# Patient Record
Sex: FEMALE | Race: WHITE | NOT HISPANIC OR LATINO | Employment: OTHER | URBAN - METROPOLITAN AREA
[De-identification: names, ages, dates, MRNs, and addresses within clinical notes are randomized per-mention and may not be internally consistent; named-entity substitution may affect disease eponyms.]

---

## 2017-02-09 ENCOUNTER — TRANSCRIBE ORDERS (OUTPATIENT)
Dept: ADMINISTRATIVE | Facility: HOSPITAL | Age: 72
End: 2017-02-09

## 2017-02-09 DIAGNOSIS — R41.0 CONFUSION: ICD-10-CM

## 2017-02-09 DIAGNOSIS — R55 SYNCOPE, UNSPECIFIED SYNCOPE TYPE: Primary | ICD-10-CM

## 2017-02-17 ENCOUNTER — HOSPITAL ENCOUNTER (OUTPATIENT)
Dept: CT IMAGING | Facility: HOSPITAL | Age: 72
Discharge: HOME | End: 2017-02-17
Payer: COMMERCIAL

## 2017-02-17 DIAGNOSIS — R55 SYNCOPE, UNSPECIFIED SYNCOPE TYPE: ICD-10-CM

## 2017-02-17 DIAGNOSIS — R41.0 CONFUSION: ICD-10-CM

## 2017-02-17 PROCEDURE — 70450 CT HEAD/BRAIN W/O DYE: CPT

## 2017-08-30 ENCOUNTER — CONVERSION ENCOUNTER (OUTPATIENT)
Dept: MAMMOGRAPHY | Facility: CLINIC | Age: 72
End: 2017-08-30

## 2017-08-30 ENCOUNTER — GENERIC CONVERSION - ENCOUNTER (OUTPATIENT)
Dept: OTHER | Facility: OTHER | Age: 72
End: 2017-08-30

## 2018-01-12 NOTE — MISCELLANEOUS
Message   Recorded as Task   Date: 03/10/2016 09:38 AM, Created By: Elise Hernandez   Task Name: Follow Up   Assigned To: [facility],Team   Regarding Patient: Maritza John, Status: Active   Comment:    Elise Hernandez - 10 Mar 2016 9:38 AM     TASK CREATED  Caller: Shirley Price  Routine post op call placed to patient  She had her surgery on 3/9/16  She had a right breast biopsy, lumpectomy breast needle localized excisional biopsy  She had no problems to report  She did ask if she may drive  I informed her that if she is not taking prescription pain medication, she may drive as long as she feels able  Her post op appointment is scheduled on 3/21/16 @ 1300 with ANA MARÍA Barrow  Pathology is pending  Lin Murphy - 10 Mar 2016 11:16 AM     TASK REASSIGNED: Previously Assigned To Morena Laurent - 14 Mar 2016 3:22 PM     TASK EDITED  Pathology pending  Lin Murphy - 22 Mar 2016 8:34 AM     TASK EDITED  Called patient and informed her that the pathology on the breast biopsy done 3/9/16 has now been resulted and was benign  No signs of cancer or abnormal concerning cells  Active Problems    1  Abnormal mammogram (793 80) (R92 8)   2  Breast cancer genetic susceptibility (V84 01) (Z15 01)    Current Meds   1  Ambien 10 MG Oral Tablet (Zolpidem Tartrate); Therapy: (Recorded:85Raw3702) to Recorded   2  Calcium TABS; Therapy: (Recorded:86Bmk4251) to Recorded   3  Estrace CREA; Therapy: (Recorded:74Lit4384) to Recorded   4  Vitamin B-12 TABS; Therapy: (Recorded:17Fwu7112) to Recorded   5  Vitamin C TABS; Therapy: (Recorded:02Wga4057) to Recorded   6  Vitamin D TABS; Therapy: (Recorded:65Jkd4463) to Recorded   7  Vitamin E TABS; Therapy: (Recorded:10Apx4837) to Recorded    Allergies    1  Cipro TABS   2  Morphine Derivatives   3   Penicillins    Signatures   Electronically signed by : Shirley Price, ; Mar 22 2016  8:35AM EST (Author)

## 2018-08-24 ENCOUNTER — TRANSCRIBE ORDERS (OUTPATIENT)
Dept: ADMINISTRATIVE | Facility: HOSPITAL | Age: 73
End: 2018-08-24

## 2018-08-24 DIAGNOSIS — Z12.39 SCREENING BREAST EXAMINATION: Primary | ICD-10-CM

## 2018-08-31 ENCOUNTER — HOSPITAL ENCOUNTER (OUTPATIENT)
Dept: MAMMOGRAPHY | Facility: CLINIC | Age: 73
Discharge: HOME | End: 2018-08-31
Payer: COMMERCIAL

## 2018-08-31 DIAGNOSIS — Z12.39 SCREENING BREAST EXAMINATION: ICD-10-CM

## 2018-08-31 PROCEDURE — 77067 SCR MAMMO BI INCL CAD: CPT

## 2018-09-07 ENCOUNTER — HOSPITAL ENCOUNTER (OUTPATIENT)
Dept: RADIOLOGY | Facility: HOSPITAL | Age: 73
Discharge: HOME | End: 2018-09-07
Payer: COMMERCIAL

## 2018-09-07 ENCOUNTER — TRANSCRIBE ORDERS (OUTPATIENT)
Dept: ADMINISTRATIVE | Facility: HOSPITAL | Age: 73
End: 2018-09-07

## 2018-09-07 DIAGNOSIS — R05.9 COUGH: ICD-10-CM

## 2018-09-07 DIAGNOSIS — R06.00 DYSPNEA, UNSPECIFIED TYPE: ICD-10-CM

## 2018-09-07 DIAGNOSIS — R06.00 DYSPNEA, UNSPECIFIED TYPE: Primary | ICD-10-CM

## 2018-09-07 PROCEDURE — 71046 X-RAY EXAM CHEST 2 VIEWS: CPT

## 2019-01-09 ENCOUNTER — TRANSCRIBE ORDERS (OUTPATIENT)
Dept: ADMINISTRATIVE | Facility: HOSPITAL | Age: 74
End: 2019-01-09

## 2019-01-09 ENCOUNTER — APPOINTMENT (OUTPATIENT)
Dept: LAB | Facility: HOSPITAL | Age: 74
End: 2019-01-09
Payer: COMMERCIAL

## 2019-01-09 DIAGNOSIS — E88.81 METABOLIC SYNDROME: ICD-10-CM

## 2019-01-09 DIAGNOSIS — R73.01 IMPAIRED FASTING GLUCOSE: ICD-10-CM

## 2019-01-09 DIAGNOSIS — R68.82 DECREASED LIBIDO: ICD-10-CM

## 2019-01-09 DIAGNOSIS — R73.01 IMPAIRED FASTING GLUCOSE: Primary | ICD-10-CM

## 2019-01-09 DIAGNOSIS — M54.5 LOW BACK PAIN, UNSPECIFIED BACK PAIN LATERALITY, UNSPECIFIED CHRONICITY, WITH SCIATICA PRESENCE UNSPECIFIED: ICD-10-CM

## 2019-01-09 DIAGNOSIS — R63.4 WEIGHT LOSS: ICD-10-CM

## 2019-01-09 DIAGNOSIS — E78.5 HYPERLIPIDEMIA, UNSPECIFIED HYPERLIPIDEMIA TYPE: ICD-10-CM

## 2019-01-09 DIAGNOSIS — R79.9 ABNORMAL BLOOD CHEMISTRY: ICD-10-CM

## 2019-01-09 DIAGNOSIS — E28.39 FEMALE HYPOGONADISM: ICD-10-CM

## 2019-01-09 DIAGNOSIS — E03.9 HYPOTHYROIDISM, UNSPECIFIED TYPE: ICD-10-CM

## 2019-01-09 DIAGNOSIS — M25.50 ARTHRALGIA, UNSPECIFIED JOINT: ICD-10-CM

## 2019-01-09 DIAGNOSIS — R42 DIZZINESS: ICD-10-CM

## 2019-01-09 DIAGNOSIS — I70.91 GENERALIZED ATHEROSCLEROSIS: ICD-10-CM

## 2019-01-09 DIAGNOSIS — R45.86 MOOD SWING: ICD-10-CM

## 2019-01-09 DIAGNOSIS — D50.9 IRON DEFICIENCY ANEMIA, UNSPECIFIED IRON DEFICIENCY ANEMIA TYPE: ICD-10-CM

## 2019-01-09 DIAGNOSIS — D64.9 ANEMIA, UNSPECIFIED TYPE: ICD-10-CM

## 2019-01-09 DIAGNOSIS — R51.9 NONINTRACTABLE HEADACHE, UNSPECIFIED CHRONICITY PATTERN, UNSPECIFIED HEADACHE TYPE: ICD-10-CM

## 2019-01-09 DIAGNOSIS — E55.9 VITAMIN D DEFICIENCY, UNSPECIFIED: ICD-10-CM

## 2019-01-09 DIAGNOSIS — R53.83 FATIGUE, UNSPECIFIED TYPE: ICD-10-CM

## 2019-01-09 LAB
25(OH)D3 SERPL-MCNC: 59.4 NG/ML (ref 30–100)
ALBUMIN SERPL BCP-MCNC: 4.2 G/DL (ref 3–5.2)
ALP SERPL-CCNC: 47 U/L (ref 43–122)
ALT SERPL W P-5'-P-CCNC: 24 U/L (ref 9–52)
ANION GAP SERPL CALCULATED.3IONS-SCNC: 7 MMOL/L (ref 5–14)
AST SERPL W P-5'-P-CCNC: 24 U/L (ref 14–36)
BILIRUB SERPL-MCNC: 0.3 MG/DL
BILIRUB UR QL STRIP: NEGATIVE
BUN SERPL-MCNC: 14 MG/DL (ref 5–25)
CALCIUM SERPL-MCNC: 9.2 MG/DL (ref 8.4–10.2)
CHLORIDE SERPL-SCNC: 106 MMOL/L (ref 97–108)
CHOLEST SERPL-MCNC: 193 MG/DL
CLARITY UR: CLEAR
CO2 SERPL-SCNC: 28 MMOL/L (ref 22–30)
COLOR UR: NORMAL
CREAT SERPL-MCNC: 0.73 MG/DL (ref 0.6–1.2)
ERYTHROCYTE [DISTWIDTH] IN BLOOD BY AUTOMATED COUNT: 13.9 %
FERRITIN SERPL-MCNC: 15 NG/ML (ref 8–388)
FOLATE SERPL-MCNC: >20 NG/ML (ref 3.1–17.5)
GFR SERPL CREATININE-BSD FRML MDRD: 83 ML/MIN/1.73SQ M
GLUCOSE P FAST SERPL-MCNC: 112 MG/DL (ref 70–99)
GLUCOSE UR STRIP-MCNC: NEGATIVE MG/DL
HCT VFR BLD AUTO: 43.7 % (ref 36–46)
HDLC SERPL-MCNC: 62 MG/DL (ref 40–59)
HGB BLD-MCNC: 14.2 G/DL (ref 12–16)
HGB UR QL STRIP.AUTO: NEGATIVE
IRON SATN MFR SERPL: 14 %
IRON SERPL-MCNC: 50 UG/DL (ref 50–170)
KETONES UR STRIP-MCNC: NEGATIVE MG/DL
LDLC SERPL CALC-MCNC: 119 MG/DL
LEUKOCYTE ESTERASE UR QL STRIP: NEGATIVE
MAGNESIUM SERPL-MCNC: 2.3 MG/DL (ref 1.6–2.3)
MCH RBC QN AUTO: 30.3 PG (ref 26–34)
MCHC RBC AUTO-ENTMCNC: 32.6 G/DL (ref 31–36)
MCV RBC AUTO: 93 FL (ref 80–100)
NITRITE UR QL STRIP: NEGATIVE
NONHDLC SERPL-MCNC: 131 MG/DL
PH UR STRIP.AUTO: 8 [PH] (ref 4.5–8)
PHOSPHATE SERPL-MCNC: 3.5 MG/DL (ref 2.5–4.8)
PLATELET # BLD AUTO: 284 THOUSANDS/UL (ref 150–450)
PMV BLD AUTO: 8.6 FL (ref 8.9–12.7)
POTASSIUM SERPL-SCNC: 4.8 MMOL/L (ref 3.6–5)
PROT SERPL-MCNC: 6.7 G/DL (ref 5.9–8.4)
PROT UR STRIP-MCNC: NEGATIVE MG/DL
RBC # BLD AUTO: 4.7 MILLION/UL (ref 4–5.2)
SODIUM SERPL-SCNC: 141 MMOL/L (ref 137–147)
SP GR UR STRIP.AUTO: 1.01 (ref 1–1.04)
TIBC SERPL-MCNC: 352 UG/DL (ref 250–450)
TRIGL SERPL-MCNC: 60 MG/DL
TSH SERPL DL<=0.05 MIU/L-ACNC: 0.98 UIU/ML (ref 0.47–4.68)
URATE SERPL-MCNC: 3.5 MG/DL (ref 2.7–7.5)
UROBILINOGEN UA: NEGATIVE MG/DL
VIT B12 SERPL-MCNC: 770 PG/ML (ref 100–900)
WBC # BLD AUTO: 8.2 THOUSAND/UL (ref 4.5–11)

## 2019-01-09 PROCEDURE — 82728 ASSAY OF FERRITIN: CPT

## 2019-01-09 PROCEDURE — 84100 ASSAY OF PHOSPHORUS: CPT

## 2019-01-09 PROCEDURE — 80053 COMPREHEN METABOLIC PANEL: CPT

## 2019-01-09 PROCEDURE — 83735 ASSAY OF MAGNESIUM: CPT

## 2019-01-09 PROCEDURE — 83550 IRON BINDING TEST: CPT

## 2019-01-09 PROCEDURE — 84550 ASSAY OF BLOOD/URIC ACID: CPT

## 2019-01-09 PROCEDURE — 84443 ASSAY THYROID STIM HORMONE: CPT

## 2019-01-09 PROCEDURE — 82306 VITAMIN D 25 HYDROXY: CPT

## 2019-01-09 PROCEDURE — 80061 LIPID PANEL: CPT

## 2019-01-09 PROCEDURE — 83540 ASSAY OF IRON: CPT

## 2019-01-09 PROCEDURE — 82746 ASSAY OF FOLIC ACID SERUM: CPT

## 2019-01-09 PROCEDURE — 82607 VITAMIN B-12: CPT

## 2019-01-09 PROCEDURE — 85027 COMPLETE CBC AUTOMATED: CPT

## 2019-01-09 PROCEDURE — 36415 COLL VENOUS BLD VENIPUNCTURE: CPT

## 2019-08-06 ENCOUNTER — TRANSCRIBE ORDERS (OUTPATIENT)
Dept: ADMINISTRATIVE | Facility: HOSPITAL | Age: 74
End: 2019-08-06

## 2019-08-06 DIAGNOSIS — M81.0 AGE RELATED OSTEOPOROSIS, UNSPECIFIED PATHOLOGICAL FRACTURE PRESENCE: ICD-10-CM

## 2019-08-06 DIAGNOSIS — Z12.39 BREAST SCREENING, UNSPECIFIED: Primary | ICD-10-CM

## 2019-08-22 ENCOUNTER — TRANSCRIBE ORDERS (OUTPATIENT)
Dept: ADMINISTRATIVE | Facility: HOSPITAL | Age: 74
End: 2019-08-22

## 2019-08-22 DIAGNOSIS — R19.7 DIARRHEA OF PRESUMED INFECTIOUS ORIGIN: Primary | ICD-10-CM

## 2019-08-30 ENCOUNTER — TRANSCRIBE ORDERS (OUTPATIENT)
Dept: ADMINISTRATIVE | Facility: HOSPITAL | Age: 74
End: 2019-08-30

## 2019-08-30 ENCOUNTER — HOSPITAL ENCOUNTER (OUTPATIENT)
Dept: RADIOLOGY | Facility: HOSPITAL | Age: 74
Discharge: HOME | End: 2019-08-30
Payer: COMMERCIAL

## 2019-08-30 DIAGNOSIS — M79.642 HAND PAIN, LEFT: ICD-10-CM

## 2019-08-30 DIAGNOSIS — M19.90 ARTHRITIS: Primary | ICD-10-CM

## 2019-08-30 DIAGNOSIS — M19.90 ARTHRITIS: ICD-10-CM

## 2019-08-30 DIAGNOSIS — M25.532 LEFT WRIST PAIN: ICD-10-CM

## 2019-08-30 PROCEDURE — 73100 X-RAY EXAM OF WRIST: CPT

## 2019-08-30 PROCEDURE — 73120 X-RAY EXAM OF HAND: CPT

## 2019-09-04 ENCOUNTER — HOSPITAL ENCOUNTER (OUTPATIENT)
Dept: MAMMOGRAPHY | Facility: CLINIC | Age: 74
End: 2019-09-04
Payer: COMMERCIAL

## 2019-09-04 ENCOUNTER — HOSPITAL ENCOUNTER (OUTPATIENT)
Dept: BONE DENSITY | Facility: CLINIC | Age: 74
Discharge: HOME | End: 2019-09-04
Payer: COMMERCIAL

## 2019-09-04 DIAGNOSIS — M81.0 AGE RELATED OSTEOPOROSIS, UNSPECIFIED PATHOLOGICAL FRACTURE PRESENCE: ICD-10-CM

## 2019-09-04 PROCEDURE — 77080 DXA BONE DENSITY AXIAL: CPT

## 2019-09-06 ENCOUNTER — HOSPITAL ENCOUNTER (OUTPATIENT)
Dept: RADIOLOGY | Facility: HOSPITAL | Age: 74
Discharge: HOME | End: 2019-09-06
Attending: SURGERY
Payer: COMMERCIAL

## 2019-09-06 DIAGNOSIS — R19.7 DIARRHEA OF PRESUMED INFECTIOUS ORIGIN: ICD-10-CM

## 2019-09-06 PROCEDURE — 74280 X-RAY XM COLON 2CNTRST STD: CPT

## 2019-10-04 ENCOUNTER — TRANSCRIBE ORDERS (OUTPATIENT)
Dept: ADMINISTRATIVE | Facility: HOSPITAL | Age: 74
End: 2019-10-04

## 2019-10-04 ENCOUNTER — APPOINTMENT (OUTPATIENT)
Dept: LAB | Facility: HOSPITAL | Age: 74
End: 2019-10-04
Payer: COMMERCIAL

## 2019-10-04 DIAGNOSIS — M25.50 ARTHRALGIA, UNSPECIFIED JOINT: ICD-10-CM

## 2019-10-04 DIAGNOSIS — R35.1 NOCTURIA: ICD-10-CM

## 2019-10-04 DIAGNOSIS — R21 RASH: ICD-10-CM

## 2019-10-04 DIAGNOSIS — E88.81 METABOLIC SYNDROME: ICD-10-CM

## 2019-10-04 DIAGNOSIS — R73.01 IMPAIRED FASTING GLUCOSE: Primary | ICD-10-CM

## 2019-10-04 DIAGNOSIS — M79.609 PAIN IN EXTREMITY, UNSPECIFIED EXTREMITY: ICD-10-CM

## 2019-10-04 DIAGNOSIS — R68.82 DECREASED LIBIDO: ICD-10-CM

## 2019-10-04 DIAGNOSIS — E03.9 HYPOTHYROIDISM, UNSPECIFIED TYPE: ICD-10-CM

## 2019-10-04 DIAGNOSIS — R82.90 ABNORMAL URINE: ICD-10-CM

## 2019-10-04 DIAGNOSIS — R73.01 IMPAIRED FASTING GLUCOSE: ICD-10-CM

## 2019-10-04 DIAGNOSIS — R63.5 WEIGHT GAIN: ICD-10-CM

## 2019-10-04 DIAGNOSIS — E28.39 RESISTANT OVARY SYNDROME: ICD-10-CM

## 2019-10-04 DIAGNOSIS — E55.9 VITAMIN D DEFICIENCY: ICD-10-CM

## 2019-10-04 DIAGNOSIS — R53.83 FATIGUE, UNSPECIFIED TYPE: ICD-10-CM

## 2019-10-04 LAB
25(OH)D3 SERPL-MCNC: 61.2 NG/ML (ref 30–100)
ANION GAP SERPL CALCULATED.3IONS-SCNC: 5 MMOL/L (ref 5–14)
APTT PPP: 25 SECONDS (ref 25–32)
BACTERIA UR QL AUTO: ABNORMAL /HPF
BILIRUB UR QL STRIP: NEGATIVE
BUN SERPL-MCNC: 16 MG/DL (ref 5–25)
CALCIUM SERPL-MCNC: 8.8 MG/DL (ref 8.4–10.2)
CHLORIDE SERPL-SCNC: 105 MMOL/L (ref 97–108)
CHOLEST SERPL-MCNC: 189 MG/DL
CLARITY UR: CLEAR
CO2 SERPL-SCNC: 30 MMOL/L (ref 22–30)
COLOR UR: YELLOW
CREAT SERPL-MCNC: 0.72 MG/DL (ref 0.6–1.2)
FOLATE SERPL-MCNC: >20 NG/ML (ref 3.1–17.5)
GFR SERPL CREATININE-BSD FRML MDRD: 83 ML/MIN/1.73SQ M
GLUCOSE P FAST SERPL-MCNC: 90 MG/DL (ref 70–99)
GLUCOSE UR STRIP-MCNC: NEGATIVE MG/DL
HDLC SERPL-MCNC: 50 MG/DL (ref 40–59)
HGB UR QL STRIP.AUTO: NEGATIVE
INR PPP: 0.87 (ref 0.91–1.09)
IRON SATN MFR SERPL: 18 %
IRON SERPL-MCNC: 52 UG/DL (ref 50–170)
KETONES UR STRIP-MCNC: NEGATIVE MG/DL
LDLC SERPL CALC-MCNC: 119 MG/DL
LEUKOCYTE ESTERASE UR QL STRIP: NEGATIVE
MAGNESIUM SERPL-MCNC: 2.1 MG/DL (ref 1.6–2.3)
MUCOUS THREADS UR QL AUTO: ABNORMAL
NITRITE UR QL STRIP: NEGATIVE
NON-SQ EPI CELLS URNS QL MICRO: ABNORMAL /HPF
NONHDLC SERPL-MCNC: 139 MG/DL
PH UR STRIP.AUTO: 6 [PH]
PHOSPHATE SERPL-MCNC: 3.6 MG/DL (ref 2.5–4.8)
POTASSIUM SERPL-SCNC: 4.1 MMOL/L (ref 3.6–5)
PROT UR STRIP-MCNC: NEGATIVE MG/DL
PROTHROMBIN TIME: 9.7 SECONDS (ref 9.8–12)
RBC #/AREA URNS AUTO: ABNORMAL /HPF
RETICS # CALC: 1.11 % (ref 0.87–2.63)
SODIUM SERPL-SCNC: 140 MMOL/L (ref 137–147)
SP GR UR STRIP.AUTO: 1.02 (ref 1–1.04)
TIBC SERPL-MCNC: 290 UG/DL (ref 250–450)
TRIGL SERPL-MCNC: 98 MG/DL
TSH SERPL DL<=0.05 MIU/L-ACNC: 2.6 UIU/ML (ref 0.47–4.68)
UROBILINOGEN UA: NEGATIVE MG/DL
VIT B12 SERPL-MCNC: 1244 PG/ML (ref 100–900)
WBC #/AREA URNS AUTO: ABNORMAL /HPF

## 2019-10-04 PROCEDURE — 84100 ASSAY OF PHOSPHORUS: CPT

## 2019-10-04 PROCEDURE — 85045 AUTOMATED RETICULOCYTE COUNT: CPT

## 2019-10-04 PROCEDURE — 81001 URINALYSIS AUTO W/SCOPE: CPT | Performed by: FAMILY MEDICINE

## 2019-10-04 PROCEDURE — 85610 PROTHROMBIN TIME: CPT

## 2019-10-04 PROCEDURE — 84443 ASSAY THYROID STIM HORMONE: CPT

## 2019-10-04 PROCEDURE — 80048 BASIC METABOLIC PNL TOTAL CA: CPT

## 2019-10-04 PROCEDURE — 83735 ASSAY OF MAGNESIUM: CPT

## 2019-10-04 PROCEDURE — 82626 DEHYDROEPIANDROSTERONE: CPT

## 2019-10-04 PROCEDURE — 83540 ASSAY OF IRON: CPT

## 2019-10-04 PROCEDURE — 80061 LIPID PANEL: CPT

## 2019-10-04 PROCEDURE — 36415 COLL VENOUS BLD VENIPUNCTURE: CPT

## 2019-10-04 PROCEDURE — 85730 THROMBOPLASTIN TIME PARTIAL: CPT

## 2019-10-04 PROCEDURE — 83550 IRON BINDING TEST: CPT

## 2019-10-04 PROCEDURE — 82668 ASSAY OF ERYTHROPOIETIN: CPT

## 2019-10-04 PROCEDURE — 82746 ASSAY OF FOLIC ACID SERUM: CPT

## 2019-10-04 PROCEDURE — 82607 VITAMIN B-12: CPT

## 2019-10-04 PROCEDURE — 82306 VITAMIN D 25 HYDROXY: CPT

## 2019-10-05 LAB — EPO SERPL-ACNC: 10.2 MIU/ML (ref 2.6–18.5)

## 2019-10-08 LAB — DHEA SERPL-MCNC: 110 NG/DL (ref 31–701)

## 2019-10-09 ENCOUNTER — HOSPITAL ENCOUNTER (OUTPATIENT)
Dept: MAMMOGRAPHY | Facility: CLINIC | Age: 74
Discharge: HOME | End: 2019-10-09
Payer: COMMERCIAL

## 2019-10-09 VITALS — HEIGHT: 60 IN | BODY MASS INDEX: 29.06 KG/M2 | WEIGHT: 148 LBS

## 2019-10-09 DIAGNOSIS — Z12.39 BREAST SCREENING: ICD-10-CM

## 2019-10-09 PROCEDURE — 77063 BREAST TOMOSYNTHESIS BI: CPT

## 2019-10-09 PROCEDURE — 77067 SCR MAMMO BI INCL CAD: CPT

## 2019-10-17 ENCOUNTER — HOSPITAL ENCOUNTER (OUTPATIENT)
Dept: ULTRASOUND IMAGING | Facility: CLINIC | Age: 74
Discharge: HOME | End: 2019-10-17
Payer: COMMERCIAL

## 2019-10-17 ENCOUNTER — HOSPITAL ENCOUNTER (OUTPATIENT)
Dept: MAMMOGRAPHY | Facility: CLINIC | Age: 74
Discharge: HOME | End: 2019-10-17
Payer: COMMERCIAL

## 2019-10-17 VITALS — WEIGHT: 148 LBS | BODY MASS INDEX: 29.06 KG/M2 | HEIGHT: 60 IN

## 2019-10-17 DIAGNOSIS — R92.8 ABNORMAL SCREENING MAMMOGRAM: ICD-10-CM

## 2019-10-17 PROCEDURE — 77065 DX MAMMO INCL CAD UNI: CPT

## 2019-10-17 PROCEDURE — G0279 TOMOSYNTHESIS, MAMMO: HCPCS

## 2020-02-25 ENCOUNTER — APPOINTMENT (OUTPATIENT)
Dept: LAB | Facility: HOSPITAL | Age: 75
End: 2020-02-25
Payer: COMMERCIAL

## 2020-02-25 ENCOUNTER — TRANSCRIBE ORDERS (OUTPATIENT)
Dept: ADMINISTRATIVE | Facility: HOSPITAL | Age: 75
End: 2020-02-25

## 2020-02-25 DIAGNOSIS — E88.81 METABOLIC SYNDROME: Primary | ICD-10-CM

## 2020-02-25 DIAGNOSIS — E88.81 METABOLIC SYNDROME: ICD-10-CM

## 2020-02-25 LAB
ANION GAP SERPL CALCULATED.3IONS-SCNC: 6 MMOL/L (ref 5–14)
BUN SERPL-MCNC: 15 MG/DL (ref 5–25)
CALCIUM SERPL-MCNC: 8.8 MG/DL (ref 8.4–10.2)
CHLORIDE SERPL-SCNC: 105 MMOL/L (ref 97–108)
CO2 SERPL-SCNC: 28 MMOL/L (ref 22–30)
CREAT SERPL-MCNC: 0.7 MG/DL (ref 0.6–1.2)
GFR SERPL CREATININE-BSD FRML MDRD: 86 ML/MIN/1.73SQ M
GLUCOSE SERPL-MCNC: 84 MG/DL (ref 70–99)
INR PPP: 0.91 (ref 0.91–1.09)
POTASSIUM SERPL-SCNC: 4.1 MMOL/L (ref 3.6–5)
PROTHROMBIN TIME: 10 SECONDS (ref 9.8–12)
SODIUM SERPL-SCNC: 139 MMOL/L (ref 137–147)
TSH SERPL DL<=0.05 MIU/L-ACNC: 1.64 UIU/ML (ref 0.47–4.68)

## 2020-02-25 PROCEDURE — 84443 ASSAY THYROID STIM HORMONE: CPT

## 2020-02-25 PROCEDURE — 85610 PROTHROMBIN TIME: CPT

## 2020-02-25 PROCEDURE — 36415 COLL VENOUS BLD VENIPUNCTURE: CPT

## 2020-02-25 PROCEDURE — 80048 BASIC METABOLIC PNL TOTAL CA: CPT

## 2020-09-11 ENCOUNTER — TRANSCRIBE ORDERS (OUTPATIENT)
Dept: MAMMOGRAPHY | Facility: CLINIC | Age: 75
End: 2020-09-11

## 2020-09-11 DIAGNOSIS — Z12.31 ENCOUNTER FOR SCREENING MAMMOGRAM FOR MALIGNANT NEOPLASM OF BREAST: Primary | ICD-10-CM

## 2020-10-27 ENCOUNTER — HOSPITAL ENCOUNTER (OUTPATIENT)
Dept: RADIOLOGY | Facility: HOSPITAL | Age: 75
Discharge: HOME | End: 2020-10-27
Payer: COMMERCIAL

## 2020-10-27 ENCOUNTER — LAB (OUTPATIENT)
Dept: LAB | Facility: HOSPITAL | Age: 75
End: 2020-10-27
Payer: COMMERCIAL

## 2020-10-27 ENCOUNTER — TRANSCRIBE ORDERS (OUTPATIENT)
Dept: LAB | Facility: HOSPITAL | Age: 75
End: 2020-10-27

## 2020-10-27 ENCOUNTER — TRANSCRIBE ORDERS (OUTPATIENT)
Dept: ADMINISTRATIVE | Facility: HOSPITAL | Age: 75
End: 2020-10-27

## 2020-10-27 DIAGNOSIS — R05.9 COUGH: ICD-10-CM

## 2020-10-27 DIAGNOSIS — E03.9 HYPOTHYROIDISM, UNSPECIFIED TYPE: ICD-10-CM

## 2020-10-27 DIAGNOSIS — E55.9 VITAMIN D DEFICIENCY: ICD-10-CM

## 2020-10-27 DIAGNOSIS — J44.9 OBSTRUCTIVE CHRONIC BRONCHITIS WITHOUT EXACERBATION (HCC): ICD-10-CM

## 2020-10-27 DIAGNOSIS — I10 HYPERTENSION, UNSPECIFIED TYPE: Primary | ICD-10-CM

## 2020-10-27 DIAGNOSIS — R06.02 SHORTNESS OF BREATH: ICD-10-CM

## 2020-10-27 DIAGNOSIS — E78.41 ELEVATED LIPOPROTEIN A LEVEL: ICD-10-CM

## 2020-10-27 DIAGNOSIS — Z87.891 PERSONAL HISTORY OF TOBACCO USE, PRESENTING HAZARDS TO HEALTH: ICD-10-CM

## 2020-10-27 DIAGNOSIS — Z87.891 PERSONAL HISTORY OF TOBACCO USE, PRESENTING HAZARDS TO HEALTH: Primary | ICD-10-CM

## 2020-10-27 LAB
25(OH)D3 SERPL-MCNC: 52.7 NG/ML (ref 30–100)
ANION GAP SERPL CALCULATED.3IONS-SCNC: 4 MMOL/L (ref 5–14)
BUN SERPL-MCNC: 15 MG/DL (ref 5–25)
CALCIUM SERPL-MCNC: 8.7 MG/DL (ref 8.4–10.2)
CHLORIDE SERPL-SCNC: 106 MMOL/L (ref 97–108)
CHOLEST SERPL-MCNC: 166 MG/DL
CO2 SERPL-SCNC: 28 MMOL/L (ref 22–30)
CREAT SERPL-MCNC: 0.76 MG/DL (ref 0.6–1.2)
GFR SERPL CREATININE-BSD FRML MDRD: 78 ML/MIN/1.73SQ M
GLUCOSE P FAST SERPL-MCNC: 92 MG/DL (ref 70–99)
HDLC SERPL-MCNC: 48 MG/DL
LDLC SERPL CALC-MCNC: 98 MG/DL
MAGNESIUM SERPL-MCNC: 2.1 MG/DL (ref 1.6–2.3)
NONHDLC SERPL-MCNC: 118 MG/DL
PHOSPHATE SERPL-MCNC: 3.8 MG/DL (ref 2.5–4.8)
POTASSIUM SERPL-SCNC: 4.3 MMOL/L (ref 3.6–5)
SODIUM SERPL-SCNC: 138 MMOL/L (ref 137–147)
TRIGL SERPL-MCNC: 98 MG/DL
TSH SERPL DL<=0.05 MIU/L-ACNC: 2.11 UIU/ML (ref 0.47–4.68)

## 2020-10-27 PROCEDURE — 71046 X-RAY EXAM CHEST 2 VIEWS: CPT

## 2020-10-27 PROCEDURE — 84443 ASSAY THYROID STIM HORMONE: CPT

## 2020-10-27 PROCEDURE — 80048 BASIC METABOLIC PNL TOTAL CA: CPT

## 2020-10-27 PROCEDURE — 36415 COLL VENOUS BLD VENIPUNCTURE: CPT

## 2020-10-27 PROCEDURE — 82306 VITAMIN D 25 HYDROXY: CPT

## 2020-10-27 PROCEDURE — 84100 ASSAY OF PHOSPHORUS: CPT

## 2020-10-27 PROCEDURE — 80061 LIPID PANEL: CPT

## 2020-10-27 PROCEDURE — 83735 ASSAY OF MAGNESIUM: CPT

## 2020-11-18 ENCOUNTER — HOSPITAL ENCOUNTER (OUTPATIENT)
Dept: MAMMOGRAPHY | Facility: CLINIC | Age: 75
Discharge: HOME | End: 2020-11-18
Payer: COMMERCIAL

## 2020-11-18 VITALS — HEIGHT: 60 IN | BODY MASS INDEX: 29.06 KG/M2 | WEIGHT: 148 LBS

## 2020-11-18 DIAGNOSIS — Z12.31 ENCOUNTER FOR SCREENING MAMMOGRAM FOR MALIGNANT NEOPLASM OF BREAST: ICD-10-CM

## 2020-11-18 PROCEDURE — 77063 BREAST TOMOSYNTHESIS BI: CPT

## 2020-11-18 PROCEDURE — 77067 SCR MAMMO BI INCL CAD: CPT

## 2021-03-10 DIAGNOSIS — Z23 ENCOUNTER FOR IMMUNIZATION: ICD-10-CM

## 2021-03-15 ENCOUNTER — TRANSCRIBE ORDERS (OUTPATIENT)
Dept: LAB | Facility: HOSPITAL | Age: 76
End: 2021-03-15

## 2021-03-15 ENCOUNTER — APPOINTMENT (OUTPATIENT)
Dept: LAB | Facility: HOSPITAL | Age: 76
End: 2021-03-15
Payer: COMMERCIAL

## 2021-03-15 DIAGNOSIS — E55.9 VITAMIN D DEFICIENCY: ICD-10-CM

## 2021-03-15 DIAGNOSIS — I10 HYPERTENSION, UNSPECIFIED TYPE: ICD-10-CM

## 2021-03-15 DIAGNOSIS — E03.9 HYPOTHYROIDISM, UNSPECIFIED TYPE: ICD-10-CM

## 2021-03-15 DIAGNOSIS — Z00.00 ROUTINE CHECK-UP: Primary | ICD-10-CM

## 2021-03-15 DIAGNOSIS — Z00.00 ROUTINE CHECK-UP: ICD-10-CM

## 2021-03-15 LAB
ANION GAP SERPL CALCULATED.3IONS-SCNC: 4 MMOL/L (ref 5–14)
BILIRUB UR QL STRIP: NEGATIVE
BUN SERPL-MCNC: 22 MG/DL (ref 5–25)
CALCIUM SERPL-MCNC: 9.2 MG/DL (ref 8.4–10.2)
CHLORIDE SERPL-SCNC: 105 MMOL/L (ref 97–108)
CLARITY UR: CLEAR
CO2 SERPL-SCNC: 31 MMOL/L (ref 22–30)
COLOR UR: NORMAL
CREAT SERPL-MCNC: 0.74 MG/DL (ref 0.6–1.2)
FOLATE SERPL-MCNC: 18.5 NG/ML (ref 3.1–17.5)
GFR SERPL CREATININE-BSD FRML MDRD: 80 ML/MIN/1.73SQ M
GLUCOSE P FAST SERPL-MCNC: 97 MG/DL (ref 70–99)
GLUCOSE UR STRIP-MCNC: NEGATIVE MG/DL
HGB UR QL STRIP.AUTO: NEGATIVE
IRON SATN MFR SERPL: 35 %
IRON SERPL-MCNC: 111 UG/DL (ref 50–170)
KETONES UR STRIP-MCNC: NEGATIVE MG/DL
LEUKOCYTE ESTERASE UR QL STRIP: NEGATIVE
MAGNESIUM SERPL-MCNC: 2.2 MG/DL (ref 1.6–2.3)
NITRITE UR QL STRIP: NEGATIVE
PH UR STRIP.AUTO: 6.5 [PH]
PHOSPHATE SERPL-MCNC: 3.9 MG/DL (ref 2.5–4.8)
POTASSIUM SERPL-SCNC: 4.5 MMOL/L (ref 3.6–5)
PROT UR STRIP-MCNC: NEGATIVE MG/DL
SODIUM SERPL-SCNC: 140 MMOL/L (ref 137–147)
SP GR UR STRIP.AUTO: 1.01 (ref 1–1.04)
TIBC SERPL-MCNC: 315 UG/DL (ref 250–450)
TSH SERPL DL<=0.05 MIU/L-ACNC: 2.69 UIU/ML (ref 0.47–4.68)
UROBILINOGEN UA: NEGATIVE MG/DL
VIT B12 SERPL-MCNC: 528 PG/ML (ref 100–900)

## 2021-03-15 PROCEDURE — 83540 ASSAY OF IRON: CPT

## 2021-03-15 PROCEDURE — 83735 ASSAY OF MAGNESIUM: CPT

## 2021-03-15 PROCEDURE — 36415 COLL VENOUS BLD VENIPUNCTURE: CPT

## 2021-03-15 PROCEDURE — 82607 VITAMIN B-12: CPT

## 2021-03-15 PROCEDURE — 82306 VITAMIN D 25 HYDROXY: CPT

## 2021-03-15 PROCEDURE — 82626 DEHYDROEPIANDROSTERONE: CPT

## 2021-03-15 PROCEDURE — 84443 ASSAY THYROID STIM HORMONE: CPT

## 2021-03-15 PROCEDURE — 84100 ASSAY OF PHOSPHORUS: CPT

## 2021-03-15 PROCEDURE — 83550 IRON BINDING TEST: CPT

## 2021-03-15 PROCEDURE — 80048 BASIC METABOLIC PNL TOTAL CA: CPT

## 2021-03-15 PROCEDURE — 81003 URINALYSIS AUTO W/O SCOPE: CPT

## 2021-03-15 PROCEDURE — 84630 ASSAY OF ZINC: CPT

## 2021-03-15 PROCEDURE — 82746 ASSAY OF FOLIC ACID SERUM: CPT

## 2021-03-16 ENCOUNTER — APPOINTMENT (OUTPATIENT)
Dept: LAB | Facility: HOSPITAL | Age: 76
End: 2021-03-16
Payer: COMMERCIAL

## 2021-03-16 DIAGNOSIS — Z00.00 ROUTINE CHECK-UP: ICD-10-CM

## 2021-03-16 LAB
ERYTHROCYTE [DISTWIDTH] IN BLOOD BY AUTOMATED COUNT: 14.3 %
HCT VFR BLD AUTO: 41.3 % (ref 36–46)
HGB BLD-MCNC: 13.6 G/DL (ref 12–16)
MCH RBC QN AUTO: 30.3 PG (ref 26–34)
MCHC RBC AUTO-ENTMCNC: 32.9 G/DL (ref 31–36)
MCV RBC AUTO: 92 FL (ref 80–100)
PLATELET # BLD AUTO: 271 THOUSANDS/UL (ref 150–450)
PMV BLD AUTO: 8.8 FL (ref 8.9–12.7)
RBC # BLD AUTO: 4.49 MILLION/UL (ref 4–5.2)
WBC # BLD AUTO: 5.5 THOUSAND/UL (ref 4.5–11)

## 2021-03-16 PROCEDURE — 36415 COLL VENOUS BLD VENIPUNCTURE: CPT

## 2021-03-16 PROCEDURE — 85027 COMPLETE CBC AUTOMATED: CPT

## 2021-03-17 LAB — 25(OH)D3 SERPL-MCNC: 43.3 NG/ML (ref 30–100)

## 2021-03-18 LAB — ZINC SERPL-MCNC: 80 UG/DL (ref 44–115)

## 2021-03-19 LAB — DHEA SERPL-MCNC: 178 NG/DL (ref 31–701)

## 2021-07-13 ENCOUNTER — ANESTHESIA EVENT (OUTPATIENT)
Dept: PERIOP | Facility: HOSPITAL | Age: 76
End: 2021-07-13
Payer: COMMERCIAL

## 2021-07-14 ENCOUNTER — HOSPITAL ENCOUNTER (OUTPATIENT)
Facility: HOSPITAL | Age: 76
Setting detail: OUTPATIENT SURGERY
Discharge: HOME | End: 2021-07-14
Attending: SURGERY | Admitting: SURGERY
Payer: COMMERCIAL

## 2021-07-14 ENCOUNTER — ANESTHESIA (OUTPATIENT)
Dept: PERIOP | Facility: HOSPITAL | Age: 76
End: 2021-07-14
Payer: COMMERCIAL

## 2021-07-14 VITALS
RESPIRATION RATE: 16 BRPM | BODY MASS INDEX: 28.86 KG/M2 | OXYGEN SATURATION: 96 % | HEART RATE: 59 BPM | DIASTOLIC BLOOD PRESSURE: 56 MMHG | WEIGHT: 147 LBS | TEMPERATURE: 97 F | HEIGHT: 60 IN | SYSTOLIC BLOOD PRESSURE: 121 MMHG

## 2021-07-14 DIAGNOSIS — K64.9 UNSPECIFIED HEMORRHOIDS: ICD-10-CM

## 2021-07-14 DIAGNOSIS — Z01.818 PRE-OP TESTING: ICD-10-CM

## 2021-07-14 PROCEDURE — 88304 TISSUE EXAM BY PATHOLOGIST: CPT | Performed by: PATHOLOGY

## 2021-07-14 RX ORDER — GLYCOPYRROLATE 0.2 MG/ML
INJECTION INTRAMUSCULAR; INTRAVENOUS AS NEEDED
Status: DISCONTINUED | OUTPATIENT
Start: 2021-07-14 | End: 2021-07-14

## 2021-07-14 RX ORDER — PROMETHAZINE HYDROCHLORIDE 25 MG/ML
12.5 INJECTION, SOLUTION INTRAMUSCULAR; INTRAVENOUS ONCE AS NEEDED
Status: DISCONTINUED | OUTPATIENT
Start: 2021-07-14 | End: 2021-07-14 | Stop reason: HOSPADM

## 2021-07-14 RX ORDER — PROPOFOL 10 MG/ML
INJECTION, EMULSION INTRAVENOUS AS NEEDED
Status: DISCONTINUED | OUTPATIENT
Start: 2021-07-14 | End: 2021-07-14

## 2021-07-14 RX ORDER — LIDOCAINE 40 MG/G
CREAM TOPICAL DAILY PRN
Status: CANCELLED | OUTPATIENT
Start: 2021-07-14

## 2021-07-14 RX ORDER — ACETAMINOPHEN 325 MG/1
650 TABLET ORAL EVERY 6 HOURS PRN
Status: CANCELLED | OUTPATIENT
Start: 2021-07-14

## 2021-07-14 RX ORDER — HYDROMORPHONE HCL/PF 1 MG/ML
0.5 SYRINGE (ML) INJECTION
Status: DISCONTINUED | OUTPATIENT
Start: 2021-07-14 | End: 2021-07-14 | Stop reason: HOSPADM

## 2021-07-14 RX ORDER — KETOROLAC TROMETHAMINE 30 MG/ML
15 INJECTION, SOLUTION INTRAMUSCULAR; INTRAVENOUS EVERY 6 HOURS SCHEDULED
Status: CANCELLED | OUTPATIENT
Start: 2021-07-14 | End: 2021-07-16

## 2021-07-14 RX ORDER — LIDOCAINE HYDROCHLORIDE 20 MG/ML
JELLY TOPICAL AS NEEDED
Status: DISCONTINUED | OUTPATIENT
Start: 2021-07-14 | End: 2021-07-14 | Stop reason: HOSPADM

## 2021-07-14 RX ORDER — MIDAZOLAM HYDROCHLORIDE 2 MG/2ML
INJECTION, SOLUTION INTRAMUSCULAR; INTRAVENOUS AS NEEDED
Status: DISCONTINUED | OUTPATIENT
Start: 2021-07-14 | End: 2021-07-14

## 2021-07-14 RX ORDER — LIDOCAINE HYDROCHLORIDE 10 MG/ML
INJECTION, SOLUTION EPIDURAL; INFILTRATION; INTRACAUDAL; PERINEURAL AS NEEDED
Status: DISCONTINUED | OUTPATIENT
Start: 2021-07-14 | End: 2021-07-14

## 2021-07-14 RX ORDER — FENTANYL CITRATE 50 UG/ML
INJECTION, SOLUTION INTRAMUSCULAR; INTRAVENOUS AS NEEDED
Status: DISCONTINUED | OUTPATIENT
Start: 2021-07-14 | End: 2021-07-14

## 2021-07-14 RX ORDER — BUPIVACAINE HYDROCHLORIDE 2.5 MG/ML
INJECTION, SOLUTION EPIDURAL; INFILTRATION; INTRACAUDAL AS NEEDED
Status: DISCONTINUED | OUTPATIENT
Start: 2021-07-14 | End: 2021-07-14 | Stop reason: HOSPADM

## 2021-07-14 RX ORDER — SODIUM CHLORIDE, SODIUM LACTATE, POTASSIUM CHLORIDE, CALCIUM CHLORIDE 600; 310; 30; 20 MG/100ML; MG/100ML; MG/100ML; MG/100ML
125 INJECTION, SOLUTION INTRAVENOUS CONTINUOUS
Status: DISCONTINUED | OUTPATIENT
Start: 2021-07-14 | End: 2021-07-14 | Stop reason: HOSPADM

## 2021-07-14 RX ORDER — DEXAMETHASONE SODIUM PHOSPHATE 10 MG/ML
INJECTION, SOLUTION INTRAMUSCULAR; INTRAVENOUS AS NEEDED
Status: DISCONTINUED | OUTPATIENT
Start: 2021-07-14 | End: 2021-07-14

## 2021-07-14 RX ORDER — ONDANSETRON 2 MG/ML
INJECTION INTRAMUSCULAR; INTRAVENOUS AS NEEDED
Status: DISCONTINUED | OUTPATIENT
Start: 2021-07-14 | End: 2021-07-14

## 2021-07-14 RX ORDER — PROPOFOL 10 MG/ML
INJECTION, EMULSION INTRAVENOUS CONTINUOUS PRN
Status: DISCONTINUED | OUTPATIENT
Start: 2021-07-14 | End: 2021-07-14

## 2021-07-14 RX ORDER — ONDANSETRON 2 MG/ML
4 INJECTION INTRAMUSCULAR; INTRAVENOUS ONCE AS NEEDED
Status: DISCONTINUED | OUTPATIENT
Start: 2021-07-14 | End: 2021-07-14 | Stop reason: HOSPADM

## 2021-07-14 RX ORDER — KETAMINE HYDROCHLORIDE 50 MG/ML
INJECTION, SOLUTION, CONCENTRATE INTRAMUSCULAR; INTRAVENOUS AS NEEDED
Status: DISCONTINUED | OUTPATIENT
Start: 2021-07-14 | End: 2021-07-14

## 2021-07-14 RX ADMIN — DEXAMETHASONE SODIUM PHOSPHATE 4 MG: 10 INJECTION, SOLUTION INTRAMUSCULAR; INTRAVENOUS at 13:08

## 2021-07-14 RX ADMIN — FENTANYL CITRATE 25 MCG: 50 INJECTION, SOLUTION INTRAMUSCULAR; INTRAVENOUS at 13:13

## 2021-07-14 RX ADMIN — PROPOFOL 50 MG: 10 INJECTION, EMULSION INTRAVENOUS at 13:04

## 2021-07-14 RX ADMIN — METRONIDAZOLE 500 MG: 500 INJECTION, SOLUTION INTRAVENOUS at 12:56

## 2021-07-14 RX ADMIN — KETAMINE HYDROCHLORIDE 10 MG: 50 INJECTION, SOLUTION INTRAMUSCULAR; INTRAVENOUS at 13:13

## 2021-07-14 RX ADMIN — FENTANYL CITRATE 25 MCG: 50 INJECTION, SOLUTION INTRAMUSCULAR; INTRAVENOUS at 13:17

## 2021-07-14 RX ADMIN — ONDANSETRON 4 MG: 2 INJECTION INTRAMUSCULAR; INTRAVENOUS at 13:08

## 2021-07-14 RX ADMIN — LIDOCAINE HYDROCHLORIDE 50 MG: 10 INJECTION, SOLUTION EPIDURAL; INFILTRATION; INTRACAUDAL; PERINEURAL at 13:04

## 2021-07-14 RX ADMIN — PROPOFOL 75 MCG/KG/MIN: 10 INJECTION, EMULSION INTRAVENOUS at 13:04

## 2021-07-14 RX ADMIN — GLYCOPYRROLATE 0.2 MG: 0.2 INJECTION, SOLUTION INTRAMUSCULAR; INTRAVENOUS at 13:05

## 2021-07-14 RX ADMIN — MIDAZOLAM 2 MG: 1 INJECTION INTRAMUSCULAR; INTRAVENOUS at 12:55

## 2021-07-14 RX ADMIN — KETAMINE HYDROCHLORIDE 20 MG: 50 INJECTION, SOLUTION INTRAMUSCULAR; INTRAVENOUS at 13:04

## 2021-07-14 RX ADMIN — SODIUM CHLORIDE, SODIUM LACTATE, POTASSIUM CHLORIDE, AND CALCIUM CHLORIDE: .6; .31; .03; .02 INJECTION, SOLUTION INTRAVENOUS at 12:55

## 2021-07-14 RX ADMIN — FENTANYL CITRATE 25 MCG: 50 INJECTION, SOLUTION INTRAMUSCULAR; INTRAVENOUS at 13:04

## 2021-07-14 NOTE — ANESTHESIA POSTPROCEDURE EVALUATION
Post-Op Assessment Note    CV Status:  Stable  Pain Score: 0    Pain management: adequate     Mental Status:  Sleepy and arousable   Hydration Status:  Euvolemic   PONV Controlled:  Controlled   Airway Patency:  Patent      Post Op Vitals Reviewed: Yes      Staff: CRNA, Anesthesiologist         No complications documented      BP   107/52   Temp   97   Pulse  72   Resp   14   SpO2   100%

## 2021-07-14 NOTE — INTERVAL H&P NOTE
H&P reviewed  After examining the patient I find no changes in the patients condition since the H&P had been written      Vitals:    07/14/21 1110   BP: 121/59   Pulse: 68   Resp: 16   Temp: 98 °F (36 7 °C)   SpO2: 98%

## 2021-07-14 NOTE — DISCHARGE INSTRUCTIONS
Sitz bath 20 minutes every 12 hours  Take Tylenol and Motrin for pain    Take 1 Percocet every 4 hours for severe pain    Apply ice to the area while awake  See Dr Morales Whittaker in  1 week  Call [phone] far a  time

## 2021-07-14 NOTE — H&P
H&P Exam - General Surgery   Sunitha Beck 77 y o  female MRN: [id number]  Unit/Bed#:  Encounter: [id number]    Assessment/Plan     Assessment:  Painful thrombosed hemorrhoids2 internal externa  Plan:  Internal and external hemorrhoidectomy    History of Present Illness   History, ROS and PFSH unobtainable from any source due to none  HPI:  Sunitha Beck is a 77 y o  female who presents with rectal pain  Patient denies rectal bleeding  She also denies abdominal pain  There is no nausea vomiting       Review of Systems   All other systems reviewed and are negative        Historical Information   Past Medical History:   Diagnosis Date    History of uterine cancer     Insomnia     Lumbar disc disease      Past Surgical History:   Procedure Laterality Date    APPENDECTOMY      APPENDECTOMY      BREAST CYST EXCISION Right 10/24/2007    benign    BREAST CYST EXCISION Right 03/09/2016    benign    BREAST LUMPECTOMY      BREAST LUMPECTOMY Right 3/9/2016    Procedure:  RIGHT BREAST BIOPSY ,LUMPECTOMY BREAST NEEDLE LOCALIZED @ 1230 ;  Surgeon: Carmen Buchanan MD;  Location: [facility];  Service:     BREAST SURGERY      lumpectomy    CATARACT EXTRACTION      COLONOSCOPY      HYSTERECTOMY      Age 26    MAMMO NEEDLE LOCALIZATION RIGHT (ALL INC) Right 3/9/2016    OOPHORECTOMY      TONSILLECTOMY       Social History   Social History     Substance and Sexual Activity   Alcohol Use Yes    Comment: Special occ     Social History     Substance and Sexual Activity   Drug Use Yes     Social History     Tobacco Use   Smoking Status Current Every Day Smoker    Packs/day: 0 50    Years: 55 00    Pack years: 27 50   Smokeless Tobacco Never Used   Tobacco Comment    declines need for smoking cessation/ 1 pk per week     E-Cigarette/Vaping    E-Cigarette Use Never User      E-Cigarette/Vaping Substances     Family History: non-contributory    Meds/Allergies   all medications and allergies reviewed  Allergies Allergen Reactions    Codeine Hives    Morphine And Related Other (See Comments)     Hallucinations and vomiting  Pt reports being sensative to most    Other Anaphylaxis and GI Intolerance     All Seafood, Banana, mushrooms, tomatoes, watermelon    Penicillins Anaphylaxis    Ciprofloxacin Hives     Rash, vomiting       Objective   First Vitals:        Current Vitals:        No intake or output data in the 24 hours ending 07/14/21 0943    Invasive Devices     Peripheral Intravenous Line            Peripheral IV 11/11/16 Left Antecubital 1705 days                Physical Exam  Vitals reviewed  Constitutional:       Appearance: Normal appearance  Cardiovascular:      Heart sounds: Normal heart sounds  Pulmonary:      Breath sounds: Normal breath sounds  Abdominal:      Palpations: Abdomen is soft  Tenderness: There is no abdominal tenderness  Genitourinary:     Comments: Positive thrombosed hemorrhoid left lateral quadrant  Musculoskeletal:         General: Normal range of motion  Cervical back: Normal range of motion  Skin:     General: Skin is warm  Neurological:      General: No focal deficit present  Mental Status: She is alert and oriented to person, place, and time  Psychiatric:         Mood and Affect: Mood normal          Thought Content: Thought content normal          Judgment: Judgment normal          Lab Results: I have personally reviewed pertinent lab results  Imaging: I have personally reviewed pertinent reports  EKG, Pathology, and Other Studies: I have personally reviewed pertinent reports  Code Status: Prior  Advance Directive and Living Will:      Power of :    POLST:      Counseling / Coordination of Care  Total floor / unit time spent today 30 minutes  Greater than 50% of total time was spent with the patient and / or family counseling and / or coordination of care  A description of the counseling / coordination of care:   Internal external hemorrhoidectomy  Rizwan Yoo

## 2021-07-14 NOTE — OP NOTE
OPERATIVE REPORT  PATIENT NAME: Taryn Cervantes    :  1945  MRN: [id number]  Pt Location:  OR ROOM 10    SURGERY DATE: 2021    Surgeon(s) and Role:     * Jewels Tao DO - Primary        Edna KYLE  Preop Diagnosis:  Unspecified hemorrhoids [K64 9]    Post-Op Diagnosis Codes:     * Unspecified hemorrhoids [K64 9]    Procedure(s) (LRB):  INTERNAL/EXTERNAL HEMORRHOIDECTOMY EXCISION (N/A)    Specimen(s):  ID Type Source Tests Collected by Time Destination   1 : left lateral quadrant hemorrhoid Tissue Hemorrhoids TISSUE EXAM Jewels Tao DO 2021 1317        Estimated Blood Loss:   Minimal    Drains:  * No LDAs found *    Anesthesia Type:   Choice    Operative Indications:  Unspecified hemorrhoids [K64 9]  Pain    Operative Findings: Thrombosed hemorrhoid left lateral quadrant    Complications:   None    Procedure and Technique: With the patient in the prone position, prepped and draped in usual manner  Digital rectal exam was carried out  There is no blood on the examining finger no palpable masses were present  The hemorrhoid in left lateral quadrant was anesthetized with 1% xylocaine and epinephrine  Right angle retractor was inserted  A high ligating stitch of 3-0 Vicryl was placed  Internal and external hemorrhoid were excised using a elliptical incision  The hemorrhoid was removed saved and sent to pathology lab for tissue diagnosis  The hemorrhage bed was cauterized  Hemostasis was adequate and she was taken to PACU in stable condition     I was present for the entire procedure    Patient Disposition:  PACU  and hemodynamically stable    SIGNATURE: Jewels Tao DO  DATE: 2021  TIME: 1:31 PM

## 2021-07-14 NOTE — ANESTHESIA PREPROCEDURE EVALUATION
Procedure:  INTERNAL/EXTERNAL HEMORRHOIDECTOMY EXCISION (N/A Anus)    Relevant Problems   NEURO/PSYCH   (+) History of uterine cancer      Other   (+) Tobacco abuse        Physical Exam    Airway    Mallampati score: II  TM Distance: >3 FB  Neck ROM: full     Dental   Comment: Edentulous ,     Cardiovascular  Cardiovascular exam normal    Pulmonary  Pulmonary exam normal     Other Findings        Anesthesia Plan  ASA Score- 2     Anesthesia Type- general with ASA Monitors  Additional Monitors:   Airway Plan:           Plan Factors-Exercise tolerance (METS): >4 METS  Chart reviewed  Existing labs reviewed  Patient is a current smoker  Patient instructed to abstain from smoking on day of procedure  Patient smoked on day of surgery  Induction- intravenous  Postoperative Plan- Plan for postoperative opioid use  Informed Consent- Anesthetic plan and risks discussed with patient  I personally reviewed this patient with the CRNA  Discussed and agreed on the Anesthesia Plan with the CRNA  Suzanne Bishop         No results found for: HGBA1C    Lab Results   Component Value Date    K 4 5 03/15/2021     03/15/2021    CO2 31 (H) 03/15/2021    BUN 22 03/15/2021    CREATININE 0 74 03/15/2021    GLUF 97 03/15/2021    CALCIUM 9 2 03/15/2021    AST 24 01/09/2019    ALT 24 01/09/2019    ALKPHOS 47 01/09/2019    EGFR 80 03/15/2021       Lab Results   Component Value Date    WBC 5 50 03/16/2021    HGB 13 6 03/16/2021    HCT 41 3 03/16/2021    MCV 92 03/16/2021     03/16/2021   Normal sinus rhythm  Normal ECG  No previous ECGs available  Confirmed by Theresa CLAUDIO MD ([id number]) on 11/13/2016 8:54:45 AMNormal sinus rhythm  Normal ECG  No previous ECGs available  Confirmed by Theresa CLAUDIO MD ([id number]) on 11/13/2016 8:54:45 AM

## 2021-07-14 NOTE — NURSING NOTE
Pt is awake,alert, tolerated diet, OOB with assistance, voided QS, no drainage noted rectally  Written and verbal instructions given to pt and friend, who verbalize an understanding and voices no questions or complaints

## 2021-08-20 ENCOUNTER — RA CDI HCC (OUTPATIENT)
Dept: OTHER | Facility: HOSPITAL | Age: 76
End: 2021-08-20

## 2021-08-20 NOTE — PROGRESS NOTES
[facility]  coding opportunities       Chart reviewed, no opportunity found: CHART REVIEWED, NO OPPORTUNITY FOUND                        Patients insurance company: [facility]

## 2021-08-27 ENCOUNTER — OFFICE VISIT (OUTPATIENT)
Dept: INTERNAL MEDICINE CLINIC | Facility: CLINIC | Age: 76
End: 2021-08-27
Payer: COMMERCIAL

## 2021-08-27 VITALS
HEIGHT: 60 IN | HEART RATE: 73 BPM | SYSTOLIC BLOOD PRESSURE: 140 MMHG | OXYGEN SATURATION: 97 % | TEMPERATURE: 97.1 F | DIASTOLIC BLOOD PRESSURE: 60 MMHG | WEIGHT: 148.4 LBS | RESPIRATION RATE: 16 BRPM | BODY MASS INDEX: 29.13 KG/M2

## 2021-08-27 DIAGNOSIS — Z72.0 TOBACCO ABUSE: ICD-10-CM

## 2021-08-27 DIAGNOSIS — R22.1 LOCALIZED SWELLING, MASS AND LUMP, NECK: Primary | ICD-10-CM

## 2021-08-27 DIAGNOSIS — Z85.42 HISTORY OF UTERINE CANCER: ICD-10-CM

## 2021-08-27 DIAGNOSIS — Z76.89 ENCOUNTER TO ESTABLISH CARE: ICD-10-CM

## 2021-08-27 PROCEDURE — 1160F RVW MEDS BY RX/DR IN RCRD: CPT | Performed by: INTERNAL MEDICINE

## 2021-08-27 PROCEDURE — 3288F FALL RISK ASSESSMENT DOCD: CPT | Performed by: INTERNAL MEDICINE

## 2021-08-27 PROCEDURE — 3725F SCREEN DEPRESSION PERFORMED: CPT | Performed by: INTERNAL MEDICINE

## 2021-08-27 PROCEDURE — 3008F BODY MASS INDEX DOCD: CPT | Performed by: INTERNAL MEDICINE

## 2021-08-27 PROCEDURE — 1101F PT FALLS ASSESS-DOCD LE1/YR: CPT | Performed by: INTERNAL MEDICINE

## 2021-08-27 PROCEDURE — 99204 OFFICE O/P NEW MOD 45 MIN: CPT | Performed by: INTERNAL MEDICINE

## 2021-08-27 RX ORDER — IRON,CARBONYL/ASCORBIC ACID 100-250 MG
1 TABLET ORAL
COMMUNITY

## 2021-08-27 NOTE — PROGRESS NOTES
INTERNAL MEDICINE OFFICE VISIT  [address] Internal Medicine- [place]    NAME: Patti Plummer  AGE: 77 y o  SEX: female    DATE OF ENCOUNTER: 8/27/2021    Assessment and Plan     1  Localized swelling, mass and lump, neck  -Seems most consistent with lipoma or possibly a cyst  Will order ultrasound for further evaluation  - US head neck soft tissue; Future    2  Encounter to establish care  - CBC and differential; Future  - Comprehensive metabolic panel; Future  - Hemoglobin A1C; Future  - Lipid panel; Future  - TSH, 3rd generation with Free T4 reflex; Future  - Vitamin D 25 hydroxy; Future    3  Tobacco abuse  -Current smoker  Continue to encourage cessation  Introduced prospect of lung CA screening which should be covered by insurance for at least a few more years     4  History of uterine cancer    5  Osteopenia  -DEXA scan in September of 2019 showed osteopenia of the lumbar spine  Continue with calcium and Vit D  Would be reasonable to repeat this Fall and will readdress at next appointment       BMI Counseling: Body mass index is 28 98 kg/m²  The BMI is above normal  Nutrition recommendations include decreasing portion sizes, encouraging healthy choices of fruits and vegetables and moderation in carbohydrate intake  Exercise recommendations include moderate physical activity 150 minutes/week  No pharmacotherapy was ordered  No orders of the defined types were placed in this encounter  Chief Complaint     Chief Complaint   Patient presents with    Establish Care       History of Present Illness     Inga Arzola is a 71-year-old female with a past medical history of vitreous detachment of the right eye, hemorrhoids status post hemorrhoidectomy, osteopenia, possible uterine cancer s/p hysterectomy/oophorectomy who is here to establish care    She previously worked as a  in a  office for 25 years  She is quite active at home, rides bike, hikes frequently   She is a current smoker with longstanding smoking history  Currently using 1 pack every two weeks    Family hx is remarkable for breast, colon, and stomach cancer    She has a hx of hemorrhoids, saw Dr Yohan Layton for colonoscopy, he did hemorrhoidectomy and her discomfort is now much improved  She states that due to anatomical issues, she is unable to have colonoscopy and does an alternate procedure involving an enema (flex sig/CT colonography?)    Surgical hx is remarkable for prior appendectomy, some form of gynecologic cancer (possibly uterine) cancer at age 26 with hysterectomy  Had two prior lumpectomies for benign lesions     She is not interested in the flu shot or other vaccinations  She apparently got the flu from a prior flu shot and almost  from it    She notes a small lump on the posterior aspect of her left neck which seems to "get harder" for two or three days at atime  She associates this with occasional dizzy spells  No drainage or bleeding from the site  Denies any pain    The following portions of the patient's history were reviewed and updated as appropriate: allergies, current medications, past family history, past medical history, past social history, past surgical history and problem list     Review of Systems     10 point ROS negative except per HPI    Active Problem List     Patient Active Problem List   Diagnosis    History of uterine cancer    Insomnia    Tobacco abuse       Objective     /60 (BP Location: Left arm, Patient Position: Sitting, Cuff Size: Standard)   Pulse 73   Temp (!) 97 1 °F (36 2 °C)   Resp 16   Ht 5' (1 524 m)   Wt 67 3 kg (148 lb 6 4 oz)   SpO2 97%   BMI 28 98 kg/m²     Physical Exam  Constitutional:       Appearance: Normal appearance  She is not ill-appearing  HENT:      Head: Normocephalic and atraumatic  Eyes:      General: No scleral icterus  Right eye: No discharge  Left eye: No discharge     Cardiovascular:      Rate and Rhythm: Normal rate and regular rhythm  Heart sounds: No murmur heard  No friction rub  Pulmonary:      Effort: Pulmonary effort is normal       Breath sounds: Normal breath sounds  No wheezing or rales  Abdominal:      General: Abdomen is flat  There is no distension  Palpations: Abdomen is soft  Tenderness: There is no abdominal tenderness  Musculoskeletal:         General: No swelling or tenderness  Skin:     General: Skin is warm and dry  Findings: Lesion (Palpable subcutaneous nodule 0 5 cm, posterior left neck  Mobile) present  No erythema  Neurological:      Mental Status: She is alert  Mental status is at baseline  Motor: No weakness  Psychiatric:         Mood and Affect: Mood normal          Behavior: Behavior normal          Pertinent Laboratory/Diagnostic Studies:  No results found  Images and diagnostics reviewed     Current Medications     Current Outpatient Medications:     ascorbic acid (VITAMIN C) 500 mg tablet, Take 500 mg by mouth daily  , Disp: , Rfl:     Calcium Citrate-Vitamin D (CALCIUM CITRATE + PO), Take 1 tablet by mouth daily  , Disp: , Rfl:     Cholecalciferol (VITAMIN D-3 PO), Take 5,000 Units by mouth daily  , Disp: , Rfl:     cyanocobalamin (VITAMIN B-12) 500 mcg tablet, Take 500 mcg by mouth daily  , Disp: , Rfl:     Iron-Vitamin C (Iron 100/C) 100-250 MG TABS, Take 1 tablet by mouth, Disp: , Rfl:     Multiple Vitamin (MULTIVITAMIN) tablet, Take 1 tablet by mouth daily  , Disp: , Rfl:     zinc gluconate 50 mg tablet, Take 50 mg by mouth daily  , Disp: , Rfl:     Health Maintenance     Health Maintenance   Topic Date Due    Hepatitis C Screening  Never done    Medicare Annual Wellness Visit (AWV)  Never done    Pneumococcal Vaccine: 65+ Years (1 of 2 - PPSV23) Never done    BMI: Followup Plan  Never done    DTaP,Tdap,and Td Vaccines (1 - Tdap) Never done    Lung Cancer Screening  Never done    Colorectal Cancer Screening  Never done    Influenza Vaccine (1) 09/01/2021    Fall Risk  08/27/2022    Depression Screening PHQ  08/27/2022    BMI: Adult  08/27/2022    COVID-19 Vaccine  Completed    HIB Vaccine  Aged Out    Hepatitis B Vaccine  Aged Out    IPV Vaccine  Aged Out    Hepatitis A Vaccine  Aged Out    Meningococcal ACWY Vaccine  Aged Out    HPV Vaccine  Aged Out     Immunization History   Administered Date(s) Administered    SARS-CoV-2 / COVID-19 mRNA IM ([employer]) 02/26/2021, 03/19/2021       LEWIS Roberts    [facility]  [address], [facility] [address]  [place], [address] [facility]  Office: [phone]

## 2021-08-28 ENCOUNTER — HOSPITAL ENCOUNTER (OUTPATIENT)
Dept: ULTRASOUND IMAGING | Facility: HOSPITAL | Age: 76
Discharge: HOME | End: 2021-08-28
Attending: INTERNAL MEDICINE
Payer: COMMERCIAL

## 2021-08-28 DIAGNOSIS — R22.1 LOCALIZED SWELLING, MASS AND LUMP, NECK: ICD-10-CM

## 2021-08-28 PROCEDURE — 76536 US EXAM OF HEAD AND NECK: CPT

## 2021-08-29 PROBLEM — H35.431: Status: ACTIVE | Noted: 2021-07-15

## 2021-08-29 PROBLEM — H43.811 POSTERIOR VITREOUS DETACHMENT OF RIGHT EYE: Status: ACTIVE | Noted: 2020-10-13

## 2021-08-29 PROBLEM — K64.9 HEMORRHOIDS: Status: ACTIVE | Noted: 2021-08-29

## 2021-09-09 ENCOUNTER — TELEPHONE (OUTPATIENT)
Dept: INTERNAL MEDICINE CLINIC | Facility: CLINIC | Age: 76
End: 2021-09-09

## 2021-09-09 NOTE — TELEPHONE ENCOUNTER
----- [facility] sent at 9/9/2021  8:04 AM EDT -----  Can you please give Belkys Ramona a call  Her ultrasound shows that the lump in her neck is a lipoma  This is a benign collection of fat under the skin   No need for removal unless she desires it for cosmetic reasons or it is causing discomfort

## 2021-11-23 ENCOUNTER — HOSPITAL ENCOUNTER (OUTPATIENT)
Dept: MAMMOGRAPHY | Facility: MEDICAL CENTER | Age: 76
Discharge: HOME | End: 2021-11-23
Payer: COMMERCIAL

## 2021-11-23 VITALS — WEIGHT: 146 LBS | BODY MASS INDEX: 28.66 KG/M2 | HEIGHT: 60 IN

## 2021-11-23 DIAGNOSIS — Z12.31 ENCOUNTER FOR SCREENING MAMMOGRAM FOR MALIGNANT NEOPLASM OF BREAST: ICD-10-CM

## 2021-11-23 PROCEDURE — 77063 BREAST TOMOSYNTHESIS BI: CPT

## 2021-11-23 PROCEDURE — 77067 SCR MAMMO BI INCL CAD: CPT

## 2021-12-22 ENCOUNTER — APPOINTMENT (OUTPATIENT)
Dept: LAB | Facility: HOSPITAL | Age: 76
End: 2021-12-22
Attending: INTERNAL MEDICINE
Payer: COMMERCIAL

## 2021-12-22 DIAGNOSIS — Z76.89 ENCOUNTER TO ESTABLISH CARE: ICD-10-CM

## 2021-12-22 LAB
25(OH)D3 SERPL-MCNC: 53.6 NG/ML (ref 30–100)
ALBUMIN SERPL BCP-MCNC: 3.7 G/DL (ref 3.5–5)
ALP SERPL-CCNC: 53 U/L (ref 46–116)
ALT SERPL W P-5'-P-CCNC: 22 U/L (ref 12–78)
ANION GAP SERPL CALCULATED.3IONS-SCNC: 8 MMOL/L (ref 4–13)
AST SERPL W P-5'-P-CCNC: 20 U/L (ref 5–45)
BASOPHILS # BLD AUTO: 0.05 THOUSANDS/ΜL (ref 0–0.1)
BASOPHILS NFR BLD AUTO: 1 % (ref 0–1)
BILIRUB SERPL-MCNC: 0.47 MG/DL (ref 0.2–1)
BUN SERPL-MCNC: 17 MG/DL (ref 5–25)
CALCIUM SERPL-MCNC: 8.6 MG/DL (ref 8.3–10.1)
CHLORIDE SERPL-SCNC: 105 MMOL/L (ref 100–108)
CHOLEST SERPL-MCNC: 194 MG/DL
CO2 SERPL-SCNC: 29 MMOL/L (ref 21–32)
CREAT SERPL-MCNC: 0.77 MG/DL (ref 0.6–1.3)
EOSINOPHIL # BLD AUTO: 0.17 THOUSAND/ΜL (ref 0–0.61)
EOSINOPHIL NFR BLD AUTO: 2 % (ref 0–6)
ERYTHROCYTE [DISTWIDTH] IN BLOOD BY AUTOMATED COUNT: 13.8 % (ref 11.6–15.1)
EST. AVERAGE GLUCOSE BLD GHB EST-MCNC: 111 MG/DL
GFR SERPL CREATININE-BSD FRML MDRD: 75 ML/MIN/1.73SQ M
GLUCOSE P FAST SERPL-MCNC: 86 MG/DL (ref 65–99)
HBA1C MFR BLD: 5.5 %
HCT VFR BLD AUTO: 44.4 % (ref 34.8–46.1)
HDLC SERPL-MCNC: 70 MG/DL
HGB BLD-MCNC: 14.1 G/DL (ref 11.5–15.4)
IMM GRANULOCYTES # BLD AUTO: 0.02 THOUSAND/UL (ref 0–0.2)
IMM GRANULOCYTES NFR BLD AUTO: 0 % (ref 0–2)
LDLC SERPL CALC-MCNC: 111 MG/DL (ref 0–100)
LYMPHOCYTES # BLD AUTO: 2.05 THOUSANDS/ΜL (ref 0.6–4.47)
LYMPHOCYTES NFR BLD AUTO: 28 % (ref 14–44)
MCH RBC QN AUTO: 29.8 PG (ref 26.8–34.3)
MCHC RBC AUTO-ENTMCNC: 31.8 G/DL (ref 31.4–37.4)
MCV RBC AUTO: 94 FL (ref 82–98)
MONOCYTES # BLD AUTO: 0.58 THOUSAND/ΜL (ref 0.17–1.22)
MONOCYTES NFR BLD AUTO: 8 % (ref 4–12)
NEUTROPHILS # BLD AUTO: 4.35 THOUSANDS/ΜL (ref 1.85–7.62)
NEUTS SEG NFR BLD AUTO: 61 % (ref 43–75)
NONHDLC SERPL-MCNC: 124 MG/DL
NRBC BLD AUTO-RTO: 0 /100 WBCS
PLATELET # BLD AUTO: 286 THOUSANDS/UL (ref 149–390)
PMV BLD AUTO: 10.2 FL (ref 8.9–12.7)
POTASSIUM SERPL-SCNC: 4.2 MMOL/L (ref 3.5–5.3)
PROT SERPL-MCNC: 6.6 G/DL (ref 6.4–8.2)
RBC # BLD AUTO: 4.73 MILLION/UL (ref 3.81–5.12)
SODIUM SERPL-SCNC: 142 MMOL/L (ref 136–145)
TRIGL SERPL-MCNC: 65 MG/DL
TSH SERPL DL<=0.05 MIU/L-ACNC: 1.84 UIU/ML (ref 0.36–3.74)
WBC # BLD AUTO: 7.22 THOUSAND/UL (ref 4.31–10.16)

## 2021-12-22 PROCEDURE — 82306 VITAMIN D 25 HYDROXY: CPT

## 2021-12-22 PROCEDURE — 36415 COLL VENOUS BLD VENIPUNCTURE: CPT

## 2021-12-22 PROCEDURE — 80053 COMPREHEN METABOLIC PANEL: CPT

## 2021-12-22 PROCEDURE — 84443 ASSAY THYROID STIM HORMONE: CPT

## 2021-12-22 PROCEDURE — 85025 COMPLETE CBC W/AUTO DIFF WBC: CPT

## 2021-12-22 PROCEDURE — 83036 HEMOGLOBIN GLYCOSYLATED A1C: CPT

## 2021-12-22 PROCEDURE — 80061 LIPID PANEL: CPT

## 2021-12-27 ENCOUNTER — RA CDI HCC (OUTPATIENT)
Dept: OTHER | Facility: HOSPITAL | Age: 76
End: 2021-12-27

## 2021-12-29 ENCOUNTER — OFFICE VISIT (OUTPATIENT)
Dept: INTERNAL MEDICINE CLINIC | Facility: CLINIC | Age: 76
End: 2021-12-29
Payer: COMMERCIAL

## 2021-12-29 VITALS
HEART RATE: 69 BPM | SYSTOLIC BLOOD PRESSURE: 145 MMHG | DIASTOLIC BLOOD PRESSURE: 69 MMHG | HEIGHT: 60 IN | TEMPERATURE: 98.5 F | WEIGHT: 149.8 LBS | OXYGEN SATURATION: 98 % | BODY MASS INDEX: 29.41 KG/M2

## 2021-12-29 DIAGNOSIS — Z85.42 HISTORY OF UTERINE CANCER: ICD-10-CM

## 2021-12-29 DIAGNOSIS — M54.50 CHRONIC BILATERAL LOW BACK PAIN WITHOUT SCIATICA: Primary | ICD-10-CM

## 2021-12-29 DIAGNOSIS — E78.00 ELEVATED LDL CHOLESTEROL LEVEL: ICD-10-CM

## 2021-12-29 DIAGNOSIS — Z72.0 TOBACCO ABUSE: ICD-10-CM

## 2021-12-29 DIAGNOSIS — G89.29 CHRONIC BILATERAL LOW BACK PAIN WITHOUT SCIATICA: Primary | ICD-10-CM

## 2021-12-29 DIAGNOSIS — E55.9 VITAMIN D DEFICIENCY: ICD-10-CM

## 2021-12-29 PROCEDURE — 99214 OFFICE O/P EST MOD 30 MIN: CPT | Performed by: INTERNAL MEDICINE

## 2021-12-29 PROCEDURE — 1160F RVW MEDS BY RX/DR IN RCRD: CPT | Performed by: INTERNAL MEDICINE

## 2021-12-29 PROCEDURE — 3008F BODY MASS INDEX DOCD: CPT | Performed by: INTERNAL MEDICINE

## 2021-12-29 RX ORDER — CELECOXIB 100 MG/1
100 CAPSULE ORAL 2 TIMES DAILY PRN
Qty: 30 CAPSULE | Refills: 0 | Status: SHIPPED | OUTPATIENT
Start: 2021-12-29 | End: 2022-07-11

## 2022-04-01 ENCOUNTER — HOSPITAL ENCOUNTER (OUTPATIENT)
Dept: CT IMAGING | Facility: HOSPITAL | Age: 77
Discharge: HOME | End: 2022-04-01
Attending: SURGERY
Payer: COMMERCIAL

## 2022-04-01 DIAGNOSIS — K57.90 DIVERTICULOSIS: ICD-10-CM

## 2022-04-01 DIAGNOSIS — Z53.9 PROCEDURE AND TREATMENT NOT CARRIED OUT, UNSPECIFIED REASON: ICD-10-CM

## 2022-04-01 DIAGNOSIS — R10.32 LLQ PAIN: ICD-10-CM

## 2022-04-01 PROCEDURE — 74261 CT COLONOGRAPHY DX: CPT

## 2022-04-01 PROCEDURE — G1004 CDSM NDSC: HCPCS

## 2022-07-05 ENCOUNTER — RA CDI HCC (OUTPATIENT)
Dept: OTHER | Facility: HOSPITAL | Age: 77
End: 2022-07-05

## 2022-07-05 NOTE — PROGRESS NOTES
[facility] 75  coding opportunities       Chart reviewed, no opportunity found:   [address]        Patients Insurance     Medicare Insurance: [employer]

## 2022-07-08 NOTE — PROGRESS NOTES
INTERNAL MEDICINE OFFICE VISIT  [facility] Internal Medicine- Jose Antonio    NAME: Mela Hammond  AGE: 77 y o  SEX: female    DATE OF ENCOUNTER: 7/11/2022    Assessment and Plan     1  Elevated LDL cholesterol level  Assessment & Plan:  Mild elevation in LDL noted on recent lipid panel  Recheck lipid panel prior to follow-up  Healthy dietary and lifestyle choices encouraged    Orders:  -     CBC and differential; Future  -     Comprehensive metabolic panel; Future  -     TSH, 3rd generation with Free T4 reflex; Future  -     Lipid panel; Future    2  Tobacco abuse  Assessment & Plan:  Continues to smoke  Overall, her usage has gone down since she quit her job  She estimates she smokes about a pack a week  Cessation encouraged      3  Vitamin D deficiency  Assessment & Plan:  Continue supplementation    Orders:  -     Vitamin D 25 hydroxy; Future    4  Glaucoma, unspecified glaucoma type, unspecified laterality  -follows with ophthalmology for her various eye issues  She is seeing a retina specialist next week    5  JUAN (stress urinary incontinence, female)  -she is having intermittent stress urinary incontinence  Has lost urine with coughing/sneezing  She has been wearing Depends  She is overall comfortable with her current degree of symptoms without any further intervention  -briefly discussed Kegel exercises  -she is on vaginal Premarin cream prescribed by her gynecologist    6  Seasonal allergies  Assessment & Plan:  Stable  Uses Allegra      7  Psoriasis  Assessment & Plan:  Stable  Will occasionally get flare ups of psoriasis affecting the scalp  Uses steroid cream as needed      8  Osteopenia of lumbar spine  -DEXA scan in September of 2019 showed osteopenia of the lumbar spine  Continue with calcium and Vit D  -repeat DEXA scan ordered today  -     DXA bone density spine hip and pelvis; Future; Expected date: 07/11/2022      She is not interested in pneumonia, shingles, or flu vaccination    She states she previously got the flu from the flu vaccination and it almost killed her       She is up-to-date on colon cancer screening  She cannot get colonoscopy due to tortuous anatomy of the colon  She had a CT colonoscopy done in April which showed sigmoid diverticulosis, no colonic polyps of 6 mm or greater size detected    She is up-to-date on mammogram                Orders Placed This Encounter   Procedures    DXA bone density spine hip and pelvis    CBC and differential    Comprehensive metabolic panel    TSH, 3rd generation with Free T4 reflex    Lipid panel    Vitamin D 25 hydroxy       Chief Complaint     Chief Complaint   Patient presents with    Medicare Wellness Visit     Needs pneumo, tdap, lung cancer screening and hep c screening       History of Present Illness     Here today for follow up/AWV   Past medical history of vitreous detachment of the right eye, glaucoma, hemorrhoids status post hemorrhoidectomy, osteopenia, possible uterine cancer s/p hysterectomy/oophorectomy, nicotine dependence      The following portions of the patient's history were reviewed and updated as appropriate: allergies, current medications, past family history, past medical history, past social history, past surgical history and problem list     Review of Systems     10 point ROS negative except per HPI    Active Problem List     Patient Active Problem List   Diagnosis    History of uterine cancer    Insomnia    Tobacco abuse    Posterior vitreous detachment of right eye    Abnormal mammogram    Cobblestone retinal degeneration, right    Hemorrhoids    Elevated LDL cholesterol level    Vitamin D deficiency    Psoriasis    Seasonal allergies       Objective     /84 (BP Location: Left arm, Patient Position: Sitting, Cuff Size: Standard)   Pulse 67   Temp 97 6 °F (36 4 °C)   Resp 20   Ht 5' 1" (1 549 m)   Wt 69 4 kg (153 lb)   SpO2 93%   BMI 28 91 kg/m²     Physical Exam  Constitutional: Appearance: Normal appearance  She is not ill-appearing  HENT:      Head: Normocephalic and atraumatic  Eyes:      General: No scleral icterus  Right eye: No discharge  Left eye: No discharge  Cardiovascular:      Rate and Rhythm: Normal rate and regular rhythm  Heart sounds: No murmur heard  No friction rub  Pulmonary:      Effort: Pulmonary effort is normal       Breath sounds: Normal breath sounds  No wheezing or rales  Abdominal:      General: Abdomen is flat  There is no distension  Palpations: Abdomen is soft  Tenderness: There is no abdominal tenderness  Musculoskeletal:         General: No swelling or tenderness  Skin:     General: Skin is warm and dry  Findings: No erythema  Neurological:      Mental Status: She is alert  Mental status is at baseline  Motor: No weakness  Psychiatric:         Mood and Affect: Mood normal          Behavior: Behavior normal          Pertinent Laboratory/Diagnostic Studies:  CT colonoscopy diagnostic wo contrast    Result Date: 4/6/2022  Impression: No colonic polyp of 6 mm or greater in size detected  Sigmoid diverticulosis and tortuosity  Workstation performed: [id number]       Images and diagnostics reviewed     Current Medications     Current Outpatient Medications:     ascorbic acid (VITAMIN C) 500 mg tablet, Take 500 mg by mouth daily  , Disp: , Rfl:     Calcium Citrate-Vitamin D (CALCIUM CITRATE + PO), Take 1 tablet by mouth daily  , Disp: , Rfl:     Cholecalciferol (VITAMIN D-3 PO), Take 5,000 Units by mouth daily  , Disp: , Rfl:     clobetasol (TEMOVATE) 0 05 % external solution, APPLY TWICE A DAY TO SCALP X 2 WEEKS THEN REDUCE TO THREE TIMES A WEEK , Disp: , Rfl:     co-enzyme Q-10 50 MG capsule, Take 200 mg by mouth daily, Disp: , Rfl:     cyanocobalamin (VITAMIN B-12) 500 mcg tablet, Take 1,000 mcg by mouth daily, Disp: , Rfl:     Iron-Vitamin C 100-250 MG TABS, Take 1 tablet by mouth, Disp: , Rfl:    Multiple Vitamin (MULTIVITAMIN) tablet, Take 1 tablet by mouth daily  , Disp: , Rfl:     Potassium 99 MG TABS, Take by mouth, Disp: , Rfl:     Turmeric (QC Tumeric Complex) 500 MG CAPS, Take by mouth, Disp: , Rfl:     vitamin E, tocopherol, 1,000 units capsule, Take 2,000 Units by mouth daily, Disp: , Rfl:     zinc gluconate 50 mg tablet, Take 50 mg by mouth daily  , Disp: , Rfl:     Health Maintenance     Health Maintenance   Topic Date Due    Hepatitis C Screening  Never done    Pneumococcal Vaccine: 65+ Years (1 - PCV) Never done    DTaP,Tdap,and Td Vaccines (1 - Tdap) Never done    Lung Cancer Screening  Never done    COVID-19 Vaccine (4 - Booster for Pfizer series) 02/01/2022    BMI: Followup Plan  08/29/2022    Influenza Vaccine (1) 09/01/2022    Fall Risk  07/11/2023    Depression Screening  07/11/2023    Medicare Annual Wellness Visit (AWV)  07/11/2023    BMI: Adult  07/11/2023    Colorectal Cancer Screening  04/01/2027    Osteoporosis Screening  Completed    HIB Vaccine  Aged Out    Hepatitis B Vaccine  Aged Out    IPV Vaccine  Aged Out    Hepatitis A Vaccine  Aged Out    Meningococcal ACWY Vaccine  Aged Out    HPV Vaccine  Aged Out     Immunization History   Administered Date(s) Administered    COVID-19 PFIZER VACCINE 0 3 ML IM 02/26/2021, 03/19/2021, 10/01/2021       LEWIS Moseley  Internal Medicine [facility]  [address] , [facility]  [place], [address] [facility]  Office: [phone]

## 2022-07-11 ENCOUNTER — OFFICE VISIT (OUTPATIENT)
Dept: INTERNAL MEDICINE CLINIC | Facility: CLINIC | Age: 77
End: 2022-07-11
Payer: COMMERCIAL

## 2022-07-11 VITALS
WEIGHT: 153 LBS | DIASTOLIC BLOOD PRESSURE: 84 MMHG | OXYGEN SATURATION: 93 % | HEART RATE: 67 BPM | BODY MASS INDEX: 28.89 KG/M2 | HEIGHT: 61 IN | TEMPERATURE: 97.6 F | SYSTOLIC BLOOD PRESSURE: 130 MMHG | RESPIRATION RATE: 20 BRPM

## 2022-07-11 DIAGNOSIS — H40.9 GLAUCOMA, UNSPECIFIED GLAUCOMA TYPE, UNSPECIFIED LATERALITY: ICD-10-CM

## 2022-07-11 DIAGNOSIS — E78.00 ELEVATED LDL CHOLESTEROL LEVEL: Primary | ICD-10-CM

## 2022-07-11 DIAGNOSIS — L40.9 PSORIASIS: ICD-10-CM

## 2022-07-11 DIAGNOSIS — J30.2 SEASONAL ALLERGIES: ICD-10-CM

## 2022-07-11 DIAGNOSIS — Z72.0 TOBACCO ABUSE: ICD-10-CM

## 2022-07-11 DIAGNOSIS — M85.88 OSTEOPENIA OF LUMBAR SPINE: ICD-10-CM

## 2022-07-11 DIAGNOSIS — E55.9 VITAMIN D DEFICIENCY: ICD-10-CM

## 2022-07-11 DIAGNOSIS — N39.3 SUI (STRESS URINARY INCONTINENCE, FEMALE): ICD-10-CM

## 2022-07-11 PROCEDURE — 1125F AMNT PAIN NOTED PAIN PRSNT: CPT | Performed by: INTERNAL MEDICINE

## 2022-07-11 PROCEDURE — 3725F SCREEN DEPRESSION PERFORMED: CPT | Performed by: INTERNAL MEDICINE

## 2022-07-11 PROCEDURE — 99214 OFFICE O/P EST MOD 30 MIN: CPT | Performed by: INTERNAL MEDICINE

## 2022-07-11 PROCEDURE — 1170F FXNL STATUS ASSESSED: CPT | Performed by: INTERNAL MEDICINE

## 2022-07-11 PROCEDURE — 1160F RVW MEDS BY RX/DR IN RCRD: CPT | Performed by: INTERNAL MEDICINE

## 2022-07-11 PROCEDURE — 3288F FALL RISK ASSESSMENT DOCD: CPT | Performed by: INTERNAL MEDICINE

## 2022-07-11 PROCEDURE — G0439 PPPS, SUBSEQ VISIT: HCPCS | Performed by: INTERNAL MEDICINE

## 2022-07-11 RX ORDER — CLOBETASOL PROPIONATE 0.46 MG/ML
SOLUTION TOPICAL
COMMUNITY
Start: 2022-04-21

## 2022-07-11 RX ORDER — MULTIVIT WITH MINERALS/LUTEIN
2000 TABLET ORAL DAILY
COMMUNITY

## 2022-07-11 RX ORDER — VIT C/B6/B5/MAGNESIUM/HERB 173 50-5-6-5MG
CAPSULE ORAL
COMMUNITY

## 2022-07-11 NOTE — PROGRESS NOTES
Assessment and Plan:     Problem List Items Addressed This Visit        Musculoskeletal and Integument    Psoriasis     Stable  Will occasionally get flare ups of psoriasis affecting the scalp  Uses steroid cream as needed           Relevant Medications    clobetasol (TEMOVATE) 0 05 % external solution       Other    Tobacco abuse     Continues to smoke  Overall, her usage has gone down since she quit her job  She estimates she smokes about a pack a week  Cessation encouraged           Elevated LDL cholesterol level - Primary     Mild elevation in LDL noted on recent lipid panel  Recheck lipid panel prior to follow-up  Healthy dietary and lifestyle choices encouraged           Relevant Orders    CBC and differential    Comprehensive metabolic panel    TSH, 3rd generation with Free T4 reflex    Lipid panel    Vitamin D deficiency     Continue supplementation           Relevant Orders    Vitamin D 25 hydroxy    Seasonal allergies     Stable  Uses Allegra             Other Visit Diagnoses     Glaucoma, unspecified glaucoma type, unspecified laterality        JUAN (stress urinary incontinence, female)        Osteopenia of lumbar spine        Relevant Orders    DXA bone density spine hip and pelvis           Preventive health issues were discussed with patient, and age appropriate screening tests were ordered as noted in patient's After Visit Summary  Personalized health advice and appropriate referrals for health education or preventive services given if needed, as noted in patient's After Visit Summary       History of Present Illness:     Patient presents for a Medicare Wellness Visit    HPI   Patient Care Team:  Kraig Lincoln DO as PCP - General (Internal Medicine)     Review of Systems:     Review of Systems     Problem List:     Patient Active Problem List   Diagnosis    History of uterine cancer    Insomnia    Tobacco abuse    Posterior vitreous detachment of right eye    Abnormal mammogram    Cobblestone retinal degeneration, right    Hemorrhoids    Elevated LDL cholesterol level    Vitamin D deficiency    Psoriasis    Seasonal allergies      Past Medical and Surgical History:     Past Medical History:   Diagnosis Date    History of uterine cancer     Insomnia     Lumbar disc disease      Past Surgical History:   Procedure Laterality Date    APPENDECTOMY      APPENDECTOMY      BREAST CYST EXCISION Right 10/24/2007    benign    BREAST CYST EXCISION Right 03/09/2016    benign    BREAST LUMPECTOMY      BREAST LUMPECTOMY Right 3/9/2016    Procedure:  RIGHT BREAST BIOPSY ,LUMPECTOMY BREAST NEEDLE LOCALIZED @ 1230 ;  Surgeon: Oscar Castaneda MD;  Location: [facility];  Service:     BREAST SURGERY      lumpectomy    CATARACT EXTRACTION      COLONOSCOPY      HYSTERECTOMY      Age 26    MAMMO NEEDLE LOCALIZATION RIGHT (ALL INC) Right 3/9/2016    OOPHORECTOMY      MT HEMORRHOIDECTOMY,INT/EXT, 2+ COLUMNS/GROUPS N/A 7/14/2021    Procedure: INTERNAL/EXTERNAL HEMORRHOIDECTOMY EXCISION;  Surgeon: Donnell Medrano DO;  Location:  MAIN OR;  Service: General    TONSILLECTOMY        Family History:     Family History   Problem Relation Age of Onset    Colon cancer Maternal Grandmother         age dx unknown    Breast cancer Maternal Aunt 80    No Known Problems Mother     No Known Problems Father     No Known Problems Sister     No Known Problems Maternal Grandfather     No Known Problems Paternal Grandmother     No Known Problems Paternal Grandfather     No Known Problems Sister     No Known Problems Brother       Social History:     Social History     Socioeconomic History    Marital status: /Civil Union     Spouse name: None    Number of children: None    Years of education: None    Highest education level: None   Occupational History    None   Tobacco Use    Smoking status: Current Every Day Smoker     Packs/day: 0 50     Years: 55 00     Pack years: 27 50     Start date: 12/29/1964    Smokeless tobacco: Never Used    Tobacco comment: smoke one pack every two weeks   Vaping Use    Vaping Use: Never used   Substance and Sexual Activity    Alcohol use: Yes     Comment: Special occ    Drug use: Yes    Sexual activity: None   Other Topics Concern    None   Social History Narrative    None     Social Determinants of Health     Financial Resource Strain: Not on file   Food Insecurity: Not on file   Transportation Needs: Not on file   Physical Activity: Not on file   Stress: Not on file   Social Connections: Not on file   Intimate Partner Violence: Not on file   Housing Stability: Not on file      Medications and Allergies:     Current Outpatient Medications   Medication Sig Dispense Refill    ascorbic acid (VITAMIN C) 500 mg tablet Take 500 mg by mouth daily   Calcium Citrate-Vitamin D (CALCIUM CITRATE + PO) Take 1 tablet by mouth daily   Cholecalciferol (VITAMIN D-3 PO) Take 5,000 Units by mouth daily   clobetasol (TEMOVATE) 0 05 % external solution APPLY TWICE A DAY TO SCALP X 2 WEEKS THEN REDUCE TO THREE TIMES A WEEK   co-enzyme Q-10 50 MG capsule Take 200 mg by mouth daily      cyanocobalamin (VITAMIN B-12) 500 mcg tablet Take 1,000 mcg by mouth daily      Iron-Vitamin C 100-250 MG TABS Take 1 tablet by mouth      Multiple Vitamin (MULTIVITAMIN) tablet Take 1 tablet by mouth daily   Potassium 99 MG TABS Take by mouth      Turmeric (QC Tumeric Complex) 500 MG CAPS Take by mouth      vitamin E, tocopherol, 1,000 units capsule Take 2,000 Units by mouth daily      zinc gluconate 50 mg tablet Take 50 mg by mouth daily  No current facility-administered medications for this visit  Allergies   Allergen Reactions    Codeine Hives    Morphine And Related Other (See Comments)     Hallucinations and vomiting   Pt reports being sensative to most    Other Anaphylaxis, GI Intolerance, Shortness Of Breath and Rash     All Seafood, Banana, mushrooms, tomatoes, watermelon  With touching tomatoes    Penicillins Anaphylaxis    Ciprofloxacin Hives     Rash, vomiting      Immunizations:     Immunization History   Administered Date(s) Administered    COVID-19 PFIZER VACCINE 0 3 ML IM 02/26/2021, 03/19/2021, 10/01/2021      Health Maintenance:         Topic Date Due    Hepatitis C Screening  Never done    Lung Cancer Screening  Never done    Colorectal Cancer Screening  04/01/2027         Topic Date Due    Pneumococcal Vaccine: 65+ Years (1 - PCV) Never done    DTaP,Tdap,and Td Vaccines (1 - Tdap) Never done    COVID-19 Vaccine (4 - Booster for Pfizer series) 02/01/2022    Influenza Vaccine (1) 09/01/2022      Medicare Screening Tests and Risk Assessments: Marquis Salinas is here for her Subsequent Wellness visit  Health Risk Assessment:   Patient rates overall health as excellent  Patient feels that their physical health rating is same  Patient is very satisfied with their life  Eyesight was rated as same  Hearing was rated as same  Patient feels that their emotional and mental health rating is same  Patients states they are never, rarely angry  Patient states they are sometimes unusually tired/fatigued  Pain experienced in the last 7 days has been none  Patient states that she has experienced no weight loss or gain in last 6 months  Fall Risk Screening: In the past year, patient has experienced: no history of falling in past year      Urinary Incontinence Screening:   Patient has leaked urine accidently in the last six months  Mostly when coughing or sneezing     Home Safety:  Patient does not have trouble with stairs inside or outside of their home  Patient has working smoke alarms and has working carbon monoxide detector  Home safety hazards include: none  Nutrition:   Current diet is Regular and Limited junk food  Medications:   Patient is currently taking over-the-counter supplements   OTC medications include: see medication list  Patient is able to manage medications  Activities of Daily Living (ADLs)/Instrumental Activities of Daily Living (IADLs):   Walk and transfer into and out of bed and chair?: Yes  Dress and groom yourself?: Yes    Bathe or shower yourself?: Yes    Feed yourself? Yes  Do your laundry/housekeeping?: Yes  Manage your money, pay your bills and track your expenses?: Yes  Make your own meals?: Yes    Do your own shopping?: Yes    Previous Hospitalizations:   Any hospitalizations or ED visits within the last 12 months?: Yes    How many hospitalizations have you had in the last year?: 1-2    Advance Care Planning:   Living will: Yes    Durable POA for healthcare: No    Advanced directive: Yes      PREVENTIVE SCREENINGS      Cardiovascular Screening:    General: Screening Current      Diabetes Screening:     General: Screening Current      Breast Cancer Screening:     General: Screening Current      Cervical Cancer Screening:    General: Screening Not Indicated      Lung Cancer Screening:     General: Screening Not Indicated    Screening, Brief Intervention, and Referral to Treatment (SBIRT)    Screening  Typical number of drinks in a day: 0  Typical number of drinks in a week: 0  Interpretation: Low risk drinking behavior      Single Item Drug Screening:  How often have you used an illegal drug (including marijuana) or a prescription medication for non-medical reasons in the past year? never    Single Item Drug Screen Score: 0  Interpretation: Negative screen for possible drug use disorder    No exam data present     Physical Exam:     /84 (BP Location: Left arm, Patient Position: Sitting, Cuff Size: Standard)   Pulse 67   Temp 97 6 °F (36 4 °C)   Resp 20   Ht 5' 1" (1 549 m)   Wt 69 4 kg (153 lb)   SpO2 93%   BMI 28 91 kg/m²     Physical Exam     Max Silas Mealing, DO

## 2022-07-11 NOTE — PATIENT INSTRUCTIONS
Medicare Preventive Visit Patient Instructions  Thank you for completing your Welcome to Medicare Visit or Medicare Annual Wellness Visit today  Your next wellness visit will be due in one year (7/12/2023)  The screening/preventive services that you may require over the next 5-10 years are detailed below  Some tests may not apply to you based off risk factors and/or age  Screening tests ordered at today's visit but not completed yet may show as past due  Also, please note that scanned in results may not display below  Preventive Screenings:  Service Recommendations Previous Testing/Comments   Colorectal Cancer Screening  * Colonoscopy    * Fecal Occult Blood Test (FOBT)/Fecal Immunochemical Test (FIT)  * Fecal DNA/Cologuard Test  * Flexible Sigmoidoscopy Age: 54-65 years old   Colonoscopy: every 10 years (may be performed more frequently if at higher risk)  OR  FOBT/FIT: every 1 year  OR  Cologuard: every 3 years  OR  Sigmoidoscopy: every 5 years  Screening may be recommended earlier than age 48 if at higher risk for colorectal cancer  Also, an individualized decision between you and your healthcare provider will decide whether screening between the ages of 74-80 would be appropriate  Colonoscopy: Not on file  FOBT/FIT: Not on file  Cologuard: Not on file  Sigmoidoscopy: Not on file          Breast Cancer Screening Age: 36 years old  Frequency: every 1-2 years  Not required if history of left and right mastectomy Mammogram: 11/23/2021    Screening Current   Cervical Cancer Screening Between the ages of 21-29, pap smear recommended once every 3 years  Between the ages of 33-67, can perform pap smear with HPV co-testing every 5 years     Recommendations may differ for women with a history of total hysterectomy, cervical cancer, or abnormal pap smears in past  Pap Smear: Not on file    Screening Not Indicated   Hepatitis C Screening Once for adults born between 1945 and 1965  More frequently in patients at high risk for Hepatitis C Hep C Antibody: Not on file        Diabetes Screening 1-2 times per year if you're at risk for diabetes or have pre-diabetes Fasting glucose: 86 mg/dL   A1C: 5 5 %    Screening Current   Cholesterol Screening Once every 5 years if you don't have a lipid disorder  May order more often based on risk factors  Lipid panel: 12/22/2021    Screening Current     Other Preventive Screenings Covered by Medicare:  1  Abdominal Aortic Aneurysm (AAA) Screening: covered once if your at risk  You're considered to be at risk if you have a family history of AAA  2  Lung Cancer Screening: covers low dose CT scan once per year if you meet all of the following conditions: (1) Age 50-69; (2) No signs or symptoms of lung cancer; (3) Current smoker or have quit smoking within the last 15 years; (4) You have a tobacco smoking history of at least 30 pack years (packs per day multiplied by number of years you smoked); (5) You get a written order from a healthcare provider  3  Glaucoma Screening: covered annually if you're considered high risk: (1) You have diabetes OR (2) Family history of glaucoma OR (3)  aged 48 and older OR (3)  American aged 72 and older  3  Osteoporosis Screening: covered every 2 years if you meet one of the following conditions: (1) You're estrogen deficient and at risk for osteoporosis based off medical history and other findings; (2) Have a vertebral abnormality; (3) On glucocorticoid therapy for more than 3 months; (4) Have primary hyperparathyroidism; (5) On osteoporosis medications and need to assess response to drug therapy  · Last bone density test (DXA Scan): 09/04/2019   5  HIV Screening: covered annually if you're between the age of 15-65  Also covered annually if you are younger than 13 and older than 72 with risk factors for HIV infection  For pregnant patients, it is covered up to 3 times per pregnancy      Immunizations:  Immunization Recommendations Influenza Vaccine Annual influenza vaccination during flu season is recommended for all persons aged >= 6 months who do not have contraindications   Pneumococcal Vaccine (Prevnar and Pneumovax)  * Prevnar = PCV13  * Pneumovax = PPSV23   Adults 25-60 years old: 1-3 doses may be recommended based on certain risk factors  Adults 72 years old: Prevnar (PCV13) vaccine recommended followed by Pneumovax (PPSV23) vaccine  If already received PPSV23 since turning 65, then PCV13 recommended at least one year after PPSV23 dose  Hepatitis B Vaccine 3 dose series if at intermediate or high risk (ex: diabetes, end stage renal disease, liver disease)   Tetanus (Td) Vaccine - COST NOT COVERED BY MEDICARE PART B Following completion of primary series, a booster dose should be given every 10 years to maintain immunity against tetanus  Td may also be given as tetanus wound prophylaxis  Tdap Vaccine - COST NOT COVERED BY MEDICARE PART B Recommended at least once for all adults  For pregnant patients, recommended with each pregnancy  Shingles Vaccine (Shingrix) - COST NOT COVERED BY MEDICARE PART B  2 shot series recommended in those aged 48 and above     Health Maintenance Due:      Topic Date Due    Hepatitis C Screening  Never done    Lung Cancer Screening  Never done    Colorectal Cancer Screening  04/01/2027     Immunizations Due:      Topic Date Due    Pneumococcal Vaccine: 65+ Years (1 - PCV) Never done    DTaP,Tdap,and Td Vaccines (1 - Tdap) Never done    COVID-19 Vaccine (4 - Booster for Pfizer series) 02/01/2022    Influenza Vaccine (1) 09/01/2022     Advance Directives   What are advance directives? Advance directives are legal documents that state your wishes and plans for medical care  These plans are made ahead of time in case you lose your ability to make decisions for yourself  Advance directives can apply to any medical decision, such as the treatments you want, and if you want to donate organs     What are the types of advance directives? There are many types of advance directives, and each state has rules about how to use them  You may choose a combination of any of the following:  · Living will: This is a written record of the treatment you want  You can also choose which treatments you do not want, which to limit, and which to stop at a certain time  This includes surgery, medicine, IV fluid, and tube feedings  · Durable power of  for healthcare [facility]): This is a written record that states who you want to make healthcare choices for you when you are unable to make them for yourself  This person, called a proxy, is usually a family member or a friend  You may choose more than 1 proxy  · Do not resuscitate (DNR) order:  A DNR order is used in case your heart stops beating or you stop breathing  It is a request not to have certain forms of treatment, such as CPR  A DNR order may be included in other types of advance directives  · Medical directive: This covers the care that you want if you are in a coma, near death, or unable to make decisions for yourself  You can list the treatments you want for each condition  Treatment may include pain medicine, surgery, blood transfusions, dialysis, IV or tube feedings, and a ventilator (breathing machine)  · Values history: This document has questions about your views, beliefs, and how you feel and think about life  This information can help others choose the care that you would choose  Why are advance directives important? An advance directive helps you control your care  Although spoken wishes may be used, it is better to have your wishes written down  Spoken wishes can be misunderstood, or not followed  Treatments may be given even if you do not want them  An advance directive may make it easier for your family to make difficult choices about your care  Urinary Incontinence   Urinary incontinence (UI)  is when you lose control of your bladder   UI develops because your bladder cannot store or empty urine properly  The 3 most common types of UI are stress incontinence, urge incontinence, or both  Medicines:   · May be given to help strengthen your bladder control  Report any side effects of medication to your healthcare provider  Do pelvic muscle exercises often:  Your pelvic muscles help you stop urinating  Squeeze these muscles tight for 5 seconds, then relax for 5 seconds  Gradually work up to squeezing for 10 seconds  Do 3 sets of 15 repetitions a day, or as directed  This will help strengthen your pelvic muscles and improve bladder control  Train your bladder:  Go to the bathroom at set times, such as every 2 hours, even if you do not feel the urge to go  You can also try to hold your urine when you feel the urge to go  For example, hold your urine for 5 minutes when you feel the urge to go  As that becomes easier, hold your urine for 10 minutes  Self-care:   · Keep a UI record  Write down how often you leak urine and how much you leak  Make a note of what you were doing when you leaked urine  · Drink liquids as directed  You may need to limit the amount of liquid you drink to help control your urine leakage  Do not drink any liquid right before you go to bed  Limit or do not have drinks that contain caffeine or alcohol  · Prevent constipation  Eat a variety of high-fiber foods  Good examples are high-fiber cereals, beans, vegetables, and whole-grain breads  Walking is the best way to trigger your intestines to have a bowel movement  · Exercise regularly and maintain a healthy weight  Weight loss and exercise will decrease pressure on your bladder and help you control your leakage  · Use a catheter as directed  to help empty your bladder  A catheter is a tiny, plastic tube that is put into your bladder to drain your urine  · Go to behavior therapy as directed    Behavior therapy may be used to help you learn to control your urge to urinate  Cigarette Smoking and Your Health   Risks to your health if you smoke:  Nicotine and other chemicals found in tobacco damage every cell in your body  Even if you are a light smoker, you have an increased risk for cancer, heart disease, and lung disease  If you are pregnant or have diabetes, smoking increases your risk for complications  Benefits to your health if you stop smoking:   · You decrease respiratory symptoms such as coughing, wheezing, and shortness of breath  · You reduce your risk for cancers of the lung, mouth, throat, kidney, bladder, pancreas, stomach, and cervix  If you already have cancer, you increase the benefits of chemotherapy  You also reduce your risk for cancer returning or a second cancer from developing  · You reduce your risk for heart disease, blood clots, heart attack, and stroke  · You reduce your risk for lung infections, and diseases such as pneumonia, asthma, chronic bronchitis, and emphysema  · Your circulation improves  More oxygen can be delivered to your body  If you have diabetes, you lower your risk for complications, such as kidney, artery, and eye diseases  You also lower your risk for nerve damage  Nerve damage can lead to amputations, poor vision, and blindness  · You improve your body's ability to heal and to fight infections  For more information and support to stop smoking:   · [employer]  Phone: 5- 284 - 721-7558  Web Address: www [employer]  Weight Management   Why it is important to manage your weight:  Being overweight increases your risk of health conditions such as heart disease, high blood pressure, type 2 diabetes, and certain types of cancer  It can also increase your risk for osteoarthritis, sleep apnea, and other respiratory problems  Aim for a slow, steady weight loss  Even a small amount of weight loss can lower your risk of health problems    How to lose weight safely:  A safe and healthy way to lose weight is to eat fewer calories and get regular exercise  You can lose up about 1 pound a week by decreasing the number of calories you eat by 500 calories each day  Healthy meal plan for weight management:  A healthy meal plan includes a variety of foods, contains fewer calories, and helps you stay healthy  A healthy meal plan includes the following:  · Eat whole-grain foods more often  A healthy meal plan should contain fiber  Fiber is the part of grains, fruits, and vegetables that is not broken down by your body  Whole-grain foods are healthy and provide extra fiber in your diet  Some examples of whole-grain foods are whole-wheat breads and pastas, oatmeal, brown rice, and bulgur  · Eat a variety of vegetables every day  Include dark, leafy greens such as spinach, kale, dane greens, and mustard greens  Eat yellow and orange vegetables such as carrots, sweet potatoes, and winter squash  · Eat a variety of fruits every day  Choose fresh or canned fruit (canned in its own juice or light syrup) instead of juice  Fruit juice has very little or no fiber  · Eat low-fat dairy foods  Drink fat-free (skim) milk or 1% milk  Eat fat-free yogurt and low-fat cottage cheese  Try low-fat cheeses such as mozzarella and other reduced-fat cheeses  · Choose meat and other protein foods that are low in fat  Choose beans or other legumes such as split peas or lentils  Choose fish, skinless poultry (chicken or turkey), or lean cuts of red meat (beef or pork)  Before you cook meat or poultry, cut off any visible fat  · Use less fat and oil  Try baking foods instead of frying them  Add less fat, such as margarine, sour cream, regular salad dressing and mayonnaise to foods  Eat fewer high-fat foods  Some examples of high-fat foods include french fries, doughnuts, ice cream, and cakes  · Eat fewer sweets  Limit foods and drinks that are high in sugar  This includes candy, cookies, regular soda, and sweetened drinks    Exercise:  Exercise at least 30 minutes per day on most days of the week  Some examples of exercise include walking, biking, dancing, and swimming  You can also fit in more physical activity by taking the stairs instead of the elevator or parking farther away from stores  Ask your healthcare provider about the best exercise plan for you  © Copyright [employer] 2018 Information is for End User's use only and may not be sold, redistributed or otherwise used for commercial purposes   All illustrations and images included in CareNotes® are the copyrighted property of [employer]  or [address] [place] [address]

## 2022-07-11 NOTE — ASSESSMENT & PLAN NOTE
Stable  Will occasionally get flare ups of psoriasis affecting the scalp    Uses steroid cream as needed

## 2022-07-11 NOTE — ASSESSMENT & PLAN NOTE
Mild elevation in LDL noted on recent lipid panel    Recheck lipid panel prior to follow-up  Healthy dietary and lifestyle choices encouraged

## 2022-07-11 NOTE — ASSESSMENT & PLAN NOTE
Continues to smoke  Overall, her usage has gone down since she quit her job    She estimates she smokes about a pack a week  Cessation encouraged

## 2022-08-01 ENCOUNTER — HOSPITAL ENCOUNTER (OUTPATIENT)
Dept: BONE DENSITY | Facility: CLINIC | Age: 77
Discharge: HOME | End: 2022-08-01
Payer: COMMERCIAL

## 2022-08-01 DIAGNOSIS — M85.88 OSTEOPENIA OF LUMBAR SPINE: ICD-10-CM

## 2022-08-01 PROCEDURE — 77080 DXA BONE DENSITY AXIAL: CPT

## 2022-11-25 ENCOUNTER — HOSPITAL ENCOUNTER (OUTPATIENT)
Dept: MAMMOGRAPHY | Facility: CLINIC | Age: 77
End: 2022-11-25

## 2022-11-25 VITALS — HEIGHT: 61 IN | WEIGHT: 153 LBS | BODY MASS INDEX: 28.89 KG/M2

## 2022-11-25 DIAGNOSIS — Z12.31 ENCOUNTER FOR MAMMOGRAM TO ESTABLISH BASELINE MAMMOGRAM: ICD-10-CM

## 2023-01-03 ENCOUNTER — RA CDI HCC (OUTPATIENT)
Dept: OTHER | Facility: HOSPITAL | Age: 78
End: 2023-01-03

## 2023-01-03 NOTE — PROGRESS NOTES
[facility]  coding opportunities       Chart reviewed, no opportunity found:   [address]        Patients Insurance     Medicare Insurance: [employer]

## 2023-01-05 ENCOUNTER — APPOINTMENT (OUTPATIENT)
Dept: LAB | Facility: HOSPITAL | Age: 78
End: 2023-01-05

## 2023-01-05 DIAGNOSIS — E55.9 VITAMIN D DEFICIENCY: ICD-10-CM

## 2023-01-05 DIAGNOSIS — E78.00 ELEVATED LDL CHOLESTEROL LEVEL: ICD-10-CM

## 2023-01-05 LAB
25(OH)D3 SERPL-MCNC: 42.7 NG/ML (ref 30–100)
ALBUMIN SERPL BCP-MCNC: 3.9 G/DL (ref 3.5–5)
ALP SERPL-CCNC: 48 U/L (ref 43–122)
ALT SERPL W P-5'-P-CCNC: 14 U/L
ANION GAP SERPL CALCULATED.3IONS-SCNC: 6 MMOL/L (ref 5–14)
AST SERPL W P-5'-P-CCNC: 25 U/L (ref 14–36)
BASOPHILS # BLD AUTO: 0.06 THOUSANDS/ÂΜL (ref 0–0.1)
BASOPHILS NFR BLD AUTO: 1 % (ref 0–1)
BILIRUB SERPL-MCNC: 0.71 MG/DL (ref 0.2–1)
BUN SERPL-MCNC: 17 MG/DL (ref 5–25)
CALCIUM SERPL-MCNC: 9 MG/DL (ref 8.4–10.2)
CHLORIDE SERPL-SCNC: 105 MMOL/L (ref 96–108)
CHOLEST SERPL-MCNC: 165 MG/DL
CO2 SERPL-SCNC: 29 MMOL/L (ref 21–32)
CREAT SERPL-MCNC: 0.79 MG/DL (ref 0.6–1.2)
EOSINOPHIL # BLD AUTO: 0.17 THOUSAND/ÂΜL (ref 0–0.61)
EOSINOPHIL NFR BLD AUTO: 3 % (ref 0–6)
ERYTHROCYTE [DISTWIDTH] IN BLOOD BY AUTOMATED COUNT: 13.6 % (ref 11.6–15.1)
GFR SERPL CREATININE-BSD FRML MDRD: 72 ML/MIN/1.73SQ M
GLUCOSE P FAST SERPL-MCNC: 94 MG/DL (ref 70–99)
HCT VFR BLD AUTO: 44.1 % (ref 34.8–46.1)
HDLC SERPL-MCNC: 54 MG/DL
HGB BLD-MCNC: 13.8 G/DL (ref 11.5–15.4)
IMM GRANULOCYTES # BLD AUTO: 0.01 THOUSAND/UL (ref 0–0.2)
IMM GRANULOCYTES NFR BLD AUTO: 0 % (ref 0–2)
LDLC SERPL CALC-MCNC: 94 MG/DL
LYMPHOCYTES # BLD AUTO: 2 THOUSANDS/ÂΜL (ref 0.6–4.47)
LYMPHOCYTES NFR BLD AUTO: 32 % (ref 14–44)
MCH RBC QN AUTO: 29.4 PG (ref 26.8–34.3)
MCHC RBC AUTO-ENTMCNC: 31.3 G/DL (ref 31.4–37.4)
MCV RBC AUTO: 94 FL (ref 82–98)
MONOCYTES # BLD AUTO: 0.48 THOUSAND/ÂΜL (ref 0.17–1.22)
MONOCYTES NFR BLD AUTO: 8 % (ref 4–12)
NEUTROPHILS # BLD AUTO: 3.52 THOUSANDS/ÂΜL (ref 1.85–7.62)
NEUTS SEG NFR BLD AUTO: 56 % (ref 43–75)
NONHDLC SERPL-MCNC: 111 MG/DL
NRBC BLD AUTO-RTO: 0 /100 WBCS
PLATELET # BLD AUTO: 279 THOUSANDS/UL (ref 149–390)
PMV BLD AUTO: 9.9 FL (ref 8.9–12.7)
POTASSIUM SERPL-SCNC: 4 MMOL/L (ref 3.5–5.3)
PROT SERPL-MCNC: 6.3 G/DL (ref 6.4–8.4)
RBC # BLD AUTO: 4.69 MILLION/UL (ref 3.81–5.12)
SODIUM SERPL-SCNC: 140 MMOL/L (ref 135–147)
TRIGL SERPL-MCNC: 84 MG/DL
TSH SERPL DL<=0.05 MIU/L-ACNC: 2.62 UIU/ML (ref 0.45–4.5)
WBC # BLD AUTO: 6.24 THOUSAND/UL (ref 4.31–10.16)

## 2023-01-11 ENCOUNTER — OFFICE VISIT (OUTPATIENT)
Dept: INTERNAL MEDICINE CLINIC | Facility: CLINIC | Age: 78
End: 2023-01-11

## 2023-01-11 VITALS
OXYGEN SATURATION: 97 % | HEART RATE: 71 BPM | HEIGHT: 61 IN | WEIGHT: 145 LBS | DIASTOLIC BLOOD PRESSURE: 68 MMHG | TEMPERATURE: 97.3 F | SYSTOLIC BLOOD PRESSURE: 120 MMHG | BODY MASS INDEX: 27.38 KG/M2

## 2023-01-11 DIAGNOSIS — F17.210 CIGARETTE NICOTINE DEPENDENCE WITHOUT COMPLICATION: ICD-10-CM

## 2023-01-11 DIAGNOSIS — R32 URINARY INCONTINENCE, UNSPECIFIED TYPE: Primary | ICD-10-CM

## 2023-01-11 DIAGNOSIS — E78.00 ELEVATED LDL CHOLESTEROL LEVEL: ICD-10-CM

## 2023-01-11 NOTE — PROGRESS NOTES
INTERNAL MEDICINE OFFICE VISIT  [address] Internal Medicine- [place]    NAME: Missy Chaves  AGE: 69 y o  SEX: female    DATE OF ENCOUNTER: 1/12/2023    Assessment and Plan/History of Present Illness     Here today for follow-up  Medical history of vitreous detachment of the right eye, hemorrhoids status post hemorrhoidectomy, osteopenia, possible uterine cancer status post hysterectomy/oophorectomy, nicotine dependence    Patient sister was in a poor living situation and lived with a hoarder and she has now moved in with the patient and her   Sister is now doing much better  Jackson Maguire and her  have been very busy cleaning out the home      1  Urinary incontinence, unspecified type  Assessment & Plan:  She has had ongoing difficulties with urinary incontinence  Has been doing Kegel exercises at home, still having trouble stopping her urinary stream   She is wearing depends at nighttime  She has been stopping her fluid intake after 6 or 7 PM   She does leak urine with coughing and sneezing  May be having mixed urge and urinary incontinence  -We will check urinalysis and ultrasound of kidneys and bladder with postvoid residual   She states that she was examined by her gynecologist in the past and told that there may have been an issue with "collapse"  I advised her to follow-up with her gynecologist to be evaluated for possible rectocele/cystocele    Orders:  -     Urinalysis with microscopic  -     US kidney and bladder with pvr; Future; Expected date: 01/11/2023    2  Cigarette nicotine dependence without complication  Assessment & Plan:  Ongoing cessation encouraged  We discussed that she is a candidate for lung cancer screening with annual low-dose CT scan  She would like to defer screening at this time      3  Elevated LDL cholesterol level  Assessment & Plan:  Improved on most recent lipid panel January 2023  Continue to monitor               BMI Counseling:  Body mass index is 27 4 kg/m²  The BMI is above normal  Nutrition recommendations include decreasing portion sizes, encouraging healthy choices of fruits and vegetables, moderation in carbohydrate intake and increasing intake of lean protein  Exercise recommendations include moderate physical activity 150 minutes/week  Rationale for BMI follow-up plan is due to patient being overweight or obese  Depression Screening and Follow-up Plan: Patient was screened for depression during today's encounter  They screened negative with a PHQ-2 score of 0  Orders Placed This Encounter   Procedures   • US kidney and bladder with pvr   • Urinalysis with microscopic       Chief Complaint     Chief Complaint   Patient presents with   • Follow-up     6 month  BMI Follow up   • Results     Labs 1/5/23       Review of Systems     10 point ROS negative except per HPI    The following portions of the patient's history were reviewed and updated as appropriate: allergies, current medications, past family history, past medical history, past social history, past surgical history and problem list     Active Problem List     Patient Active Problem List   Diagnosis   • History of uterine cancer   • Insomnia   • Cigarette nicotine dependence without complication   • Posterior vitreous detachment of right eye   • Abnormal mammogram   • Cobblestone retinal degeneration, right   • Hemorrhoids   • Elevated LDL cholesterol level   • Vitamin D deficiency   • Psoriasis   • Seasonal allergies   • Urinary incontinence       Objective     /68 (BP Location: Left arm, Patient Position: Sitting, Cuff Size: Large)   Pulse 71   Temp (!) 97 3 °F (36 3 °C) (Tympanic)   Ht 5' 1" (1 549 m)   Wt 65 8 kg (145 lb)   SpO2 97%   BMI 27 40 kg/m²     Physical Exam  Constitutional:       Appearance: Normal appearance  She is not ill-appearing  HENT:      Head: Normocephalic and atraumatic  Eyes:      General: No scleral icterus  Right eye: No discharge  Left eye: No discharge  Cardiovascular:      Rate and Rhythm: Normal rate and regular rhythm  Heart sounds: No murmur heard  No friction rub  Pulmonary:      Effort: Pulmonary effort is normal       Breath sounds: Normal breath sounds  No wheezing or rales  Abdominal:      General: Abdomen is flat  There is no distension  Palpations: Abdomen is soft  Tenderness: There is no abdominal tenderness  Musculoskeletal:         General: No swelling or tenderness  Skin:     General: Skin is warm and dry  Findings: No erythema  Neurological:      Mental Status: She is alert  Mental status is at baseline  Motor: No weakness  Psychiatric:         Mood and Affect: Mood normal          Behavior: Behavior normal          Pertinent Laboratory/Diagnostic Studies:  Mammo screening bilateral w 3d & cad    Result Date: 11/29/2022  Impression: No mammographic evidence of malignancy  ASSESSMENT/BI-RADS CATEGORY: Left: 2 - Benign Right: 2 - Benign Overall: 2 - Benign RECOMMENDATION:      - Routine screening mammogram in 1 year for both breasts  Workstation ID: [id number]       Images and diagnostics reviewed     Current Medications     Current Outpatient Medications:   •  ascorbic acid (VITAMIN C) 500 mg tablet, Take 500 mg by mouth daily  , Disp: , Rfl:   •  Calcium Citrate-Vitamin D (CALCIUM CITRATE + PO), Take 1 tablet by mouth daily  , Disp: , Rfl:   •  Cholecalciferol (VITAMIN D-3 PO), Take 5,000 Units by mouth daily  , Disp: , Rfl:   •  clobetasol (TEMOVATE) 0 05 % external solution, APPLY TWICE A DAY TO SCALP X 2 WEEKS THEN REDUCE TO THREE TIMES A WEEK , Disp: , Rfl:   •  co-enzyme Q-10 50 MG capsule, Take 200 mg by mouth daily, Disp: , Rfl:   •  cyanocobalamin (VITAMIN B-12) 500 mcg tablet, Take 1,000 mcg by mouth daily, Disp: , Rfl:   •  Iron-Vitamin C 100-250 MG TABS, Take 1 tablet by mouth, Disp: , Rfl:   •  Multiple Vitamin (MULTIVITAMIN) tablet, Take 1 tablet by mouth daily  , Disp: , Rfl:   •  Potassium 99 MG TABS, Take by mouth, Disp: , Rfl:   •  Turmeric 500 MG CAPS, Take by mouth, Disp: , Rfl:   •  vitamin E, tocopherol, 1,000 units capsule, Take 2,000 Units by mouth daily, Disp: , Rfl:   •  zinc gluconate 50 mg tablet, Take 50 mg by mouth daily  , Disp: , Rfl:     Health Maintenance     Health Maintenance   Topic Date Due   • Hepatitis C Screening  Never done   • Pneumococcal Vaccine: 65+ Years (1 - PCV) Never done   • Lung Cancer Screening  Never done   • COVID-19 Vaccine (4 - Booster for Pfizer series) 11/26/2021   • Influenza Vaccine (1) Never done   • Medicare Annual Wellness Visit (AWV)  07/11/2023   • Fall Risk  01/11/2024   • Depression Screening  01/11/2024   • Urinary Incontinence Screening  01/11/2024   • BMI: Followup Plan  01/11/2024   • BMI: Adult  01/11/2024   • Osteoporosis Screening  Completed   • HIB Vaccine  Aged Out   • IPV Vaccine  Aged Out   • Hepatitis A Vaccine  Aged Out   • Meningococcal ACWY Vaccine  Aged Out   • HPV Vaccine  Aged Out   • Colorectal Cancer Screening  Discontinued     Immunization History   Administered Date(s) Administered   • COVID-19 PFIZER VACCINE 0 3 ML IM 02/26/2021, 03/19/2021, 10/01/2021   • COVID-19 Pfizer Vac BIVALENT Arash-sucrose 12 Yr+ IM (BOOSTER ONLY) 09/19/2022       [facility] [address] , [facility] [address]  [place], [address] [facility]  Office: [phone]  Fax: [phone]

## 2023-01-12 ENCOUNTER — APPOINTMENT (OUTPATIENT)
Dept: LAB | Facility: HOSPITAL | Age: 78
End: 2023-01-12

## 2023-01-12 PROBLEM — R32 URINARY INCONTINENCE: Status: ACTIVE | Noted: 2023-01-12

## 2023-01-12 LAB
BACTERIA UR QL AUTO: NORMAL /HPF
BILIRUB UR QL STRIP: NEGATIVE
CLARITY UR: CLEAR
COLOR UR: YELLOW
GLUCOSE UR STRIP-MCNC: NEGATIVE MG/DL
HGB UR QL STRIP.AUTO: NEGATIVE
KETONES UR STRIP-MCNC: NEGATIVE MG/DL
LEUKOCYTE ESTERASE UR QL STRIP: NEGATIVE
NITRITE UR QL STRIP: NEGATIVE
NON-SQ EPI CELLS URNS QL MICRO: NORMAL /HPF
PH UR STRIP.AUTO: 7 [PH]
PROT UR STRIP-MCNC: NEGATIVE MG/DL
RBC #/AREA URNS AUTO: NORMAL /HPF
SP GR UR STRIP.AUTO: 1.01 (ref 1–1.04)
UROBILINOGEN UA: NEGATIVE MG/DL
WBC #/AREA URNS AUTO: NORMAL /HPF

## 2023-01-12 NOTE — ASSESSMENT & PLAN NOTE
Ongoing cessation encouraged  We discussed that she is a candidate for lung cancer screening with annual low-dose CT scan    She would like to defer screening at this time

## 2023-01-12 NOTE — ASSESSMENT & PLAN NOTE
She has had ongoing difficulties with urinary incontinence  Has been doing Kegel exercises at home, still having trouble stopping her urinary stream   She is wearing depends at nighttime  She has been stopping her fluid intake after 6 or 7 PM   She does leak urine with coughing and sneezing  May be having mixed urge and urinary incontinence  -We will check urinalysis and ultrasound of kidneys and bladder with postvoid residual   She states that she was examined by her gynecologist in the past and told that there may have been an issue with "collapse"    I advised her to follow-up with her gynecologist to be evaluated for possible rectocele/cystocele

## 2023-01-14 ENCOUNTER — HOSPITAL ENCOUNTER (OUTPATIENT)
Dept: ULTRASOUND IMAGING | Facility: HOSPITAL | Age: 78
Discharge: HOME | End: 2023-01-14

## 2023-01-14 DIAGNOSIS — R32 URINARY INCONTINENCE, UNSPECIFIED TYPE: ICD-10-CM

## 2023-02-14 ENCOUNTER — TELEPHONE (OUTPATIENT)
Dept: INTERNAL MEDICINE CLINIC | Facility: CLINIC | Age: 78
End: 2023-02-14

## 2023-02-14 ENCOUNTER — OFFICE VISIT (OUTPATIENT)
Dept: INTERNAL MEDICINE CLINIC | Facility: CLINIC | Age: 78
End: 2023-02-14

## 2023-02-14 VITALS
DIASTOLIC BLOOD PRESSURE: 68 MMHG | TEMPERATURE: 97.1 F | HEART RATE: 74 BPM | WEIGHT: 149.6 LBS | OXYGEN SATURATION: 99 % | SYSTOLIC BLOOD PRESSURE: 120 MMHG | HEIGHT: 61 IN | BODY MASS INDEX: 28.25 KG/M2

## 2023-02-14 DIAGNOSIS — N39.41 URGE INCONTINENCE: ICD-10-CM

## 2023-02-14 DIAGNOSIS — M54.50 ACUTE BILATERAL LOW BACK PAIN WITHOUT SCIATICA: ICD-10-CM

## 2023-02-14 DIAGNOSIS — N39.41 URGE INCONTINENCE: Primary | ICD-10-CM

## 2023-02-14 RX ORDER — OXYBUTYNIN CHLORIDE 5 MG/1
5 TABLET, EXTENDED RELEASE ORAL
Qty: 90 TABLET | Refills: 0 | Status: SHIPPED | OUTPATIENT
Start: 2023-02-14

## 2023-02-14 RX ORDER — OXYBUTYNIN CHLORIDE 5 MG/1
5 TABLET, EXTENDED RELEASE ORAL
Qty: 90 TABLET | Refills: 0 | Status: SHIPPED | OUTPATIENT
Start: 2023-02-14 | End: 2023-02-14 | Stop reason: SDUPTHER

## 2023-02-14 NOTE — PROGRESS NOTES
INTERNAL MEDICINE OFFICE VISIT  [address] [facility]- [place]    NAME: Kishore Ross  AGE: 69 y o  SEX: female    DATE OF ENCOUNTER: 2/14/2023    Assessment and Plan/History of Present Illness     Here today to review ultrasound results  Medical history of vitreous detachment of the right eye, hemorrhoids status post hemorrhoidectomy, osteopenia, possible uterine cancer status post hysterectomy/oophorectomy, nicotine dependence      1  Urge incontinence  Assessment & Plan:  Ongoing issues with urinary incontinence  Doing Kegel exercises at home, still having trouble stopping her urinary stream   She is wearing depends at nighttime  She has been stopping her fluid intake after 6 or 7 PM   She does leak urine with coughing and sneezing  She does have a reported history of possible uterine cancer status post hysterectomy/oophorectomy  -May be having mixed urge and urinary incontinence  -Reviewed ultrasound of the kidneys and bladder which was unremarkable today  - urinalysis not consistent with infection  -Discussed scheduled voiding technique  -We will trial low-dose of anticholinergic  Ditropan XL 5 mg daily at bedtime  We will also refer to urogynecology  Follow-up in the office in 1 month, if no significant improvement in symptoms, may schedule appointment with urogyn      Orders:  -     oxybutynin (DITROPAN-XL) 5 mg 24 hr tablet; Take 1 tablet (5 mg total) by mouth daily at bedtime  -     Ambulatory Referral to Urogynecology; Future    2  Acute bilateral low back pain without sciatica  Assessment & Plan:  Patient has been very busy cleaning ou her sister's home  she has had a flareup of low back pain  Located in the lumbar area, nonradiating, no associated numbness or tingling  She has a history of degenerative disc disease  She does feel the symptoms are slightly improving  Does not wish for me to send any medications to the pharmacy for this    Recommend she continue with activity as tolerated, ice/heat, topical analgesics                     Orders Placed This Encounter   Procedures   • Ambulatory Referral to Urogynecology       Chief Complaint     Chief Complaint   Patient presents with   • Follow-up     Follow up to review ultrasound results   • Back Pain     Back pain from moving furniture        Review of Systems     10 point ROS negative except per HPI    The following portions of the patient's history were reviewed and updated as appropriate: allergies, current medications, past family history, past medical history, past social history, past surgical history and problem list     Active Problem List     Patient Active Problem List   Diagnosis   • History of uterine cancer   • Insomnia   • Cigarette nicotine dependence without complication   • Posterior vitreous detachment of right eye   • Abnormal mammogram   • Cobblestone retinal degeneration, right   • Hemorrhoids   • Elevated LDL cholesterol level   • Vitamin D deficiency   • Psoriasis   • Seasonal allergies   • Urinary incontinence   • Acute bilateral low back pain without sciatica   • Urge incontinence       Objective     /68 (BP Location: Left arm, Patient Position: Sitting, Cuff Size: Standard)   Pulse 74   Temp (!) 97 1 °F (36 2 °C)   Ht 5' 1" (1 549 m)   Wt 67 9 kg (149 lb 9 6 oz)   SpO2 99%   BMI 28 27 kg/m²     Physical Exam  Constitutional:       Appearance: Normal appearance  She is not ill-appearing  HENT:      Head: Normocephalic and atraumatic  Eyes:      General: No scleral icterus  Right eye: No discharge  Left eye: No discharge  Cardiovascular:      Rate and Rhythm: Normal rate and regular rhythm  Heart sounds: No murmur heard  No friction rub  Pulmonary:      Effort: Pulmonary effort is normal       Breath sounds: Normal breath sounds  No wheezing or rales  Abdominal:      General: Abdomen is flat  There is no distension  Palpations: Abdomen is soft        Tenderness: There is no abdominal tenderness  Musculoskeletal:         General: No swelling or tenderness  Skin:     General: Skin is warm and dry  Findings: No erythema  Neurological:      Mental Status: She is alert  Mental status is at baseline  Motor: No weakness  Psychiatric:         Mood and Affect: Mood normal          Behavior: Behavior normal          Pertinent Laboratory/Diagnostic Studies:  US kidney and bladder with pvr    Result Date: 1/19/2023  Impression: Unremarkable sonographic appearance of the kidneys and urinary bladder  Workstation performed: [id number]       Images and diagnostics reviewed     Current Medications     Current Outpatient Medications:   •  ascorbic acid (VITAMIN C) 500 mg tablet, Take 500 mg by mouth daily  , Disp: , Rfl:   •  Calcium Citrate-Vitamin D (CALCIUM CITRATE + PO), Take 1 tablet by mouth daily  , Disp: , Rfl:   •  Cholecalciferol (VITAMIN D-3 PO), Take 5,000 Units by mouth daily  , Disp: , Rfl:   •  clobetasol (TEMOVATE) 0 05 % external solution, APPLY TWICE A DAY TO SCALP X 2 WEEKS THEN REDUCE TO THREE TIMES A WEEK , Disp: , Rfl:   •  co-enzyme Q-10 50 MG capsule, Take 200 mg by mouth daily, Disp: , Rfl:   •  cyanocobalamin (VITAMIN B-12) 500 mcg tablet, Take 1,000 mcg by mouth daily, Disp: , Rfl:   •  Iron-Vitamin C 100-250 MG TABS, Take 1 tablet by mouth, Disp: , Rfl:   •  Multiple Vitamin (MULTIVITAMIN) tablet, Take 1 tablet by mouth daily  , Disp: , Rfl:   •  oxybutynin (DITROPAN-XL) 5 mg 24 hr tablet, Take 1 tablet (5 mg total) by mouth daily at bedtime, Disp: 90 tablet, Rfl: 0  •  Potassium 99 MG TABS, Take by mouth, Disp: , Rfl:   •  Turmeric 500 MG CAPS, Take by mouth, Disp: , Rfl:   •  vitamin E, tocopherol, 1,000 units capsule, Take 2,000 Units by mouth daily, Disp: , Rfl:   •  zinc gluconate 50 mg tablet, Take 50 mg by mouth daily  , Disp: , Rfl:     Health Maintenance     Health Maintenance   Topic Date Due   • Hepatitis C Screening  Never done   • Pneumococcal Vaccine: 65+ Years (1 - PCV) Never done   • Lung Cancer Screening  Never done   • COVID-19 Vaccine (4 - Booster for Pfizer series) 11/14/2022   • Influenza Vaccine (1) 06/30/2023 (Originally 9/1/2022)   • Medicare Annual Wellness Visit (AWV)  07/11/2023   • BMI: Followup Plan  01/11/2024   • Fall Risk  02/14/2024   • Depression Screening  02/14/2024   • Urinary Incontinence Screening  02/14/2024   • BMI: Adult  02/14/2024   • Osteoporosis Screening  Completed   • HIB Vaccine  Aged Out   • IPV Vaccine  Aged Out   • Hepatitis A Vaccine  Aged Out   • Meningococcal ACWY Vaccine  Aged Out   • HPV Vaccine  Aged Out   • Colorectal Cancer Screening  Discontinued     Immunization History   Administered Date(s) Administered   • COVID-19 PFIZER VACCINE 0 3 ML IM 02/26/2021, 03/19/2021, 10/01/2021   • COVID-19 Pfizer Vac BIVALENT Arash-sucrose 12 Yr+ IM (BOOSTER ONLY) 09/19/2022       LEWIS Kwan  Internal Medicine [address], [facility] [address]  [place], [address]   Office: [phone]  Fax: [phone]

## 2023-02-14 NOTE — ASSESSMENT & PLAN NOTE
Ongoing issues with urinary incontinence  Doing Kegel exercises at home, still having trouble stopping her urinary stream   She is wearing depends at nighttime  She has been stopping her fluid intake after 6 or 7 PM   She does leak urine with coughing and sneezing  She does have a reported history of possible uterine cancer status post hysterectomy/oophorectomy  -May be having mixed urge and urinary incontinence  -Reviewed ultrasound of the kidneys and bladder which was unremarkable today  - urinalysis not consistent with infection  -Discussed scheduled voiding technique  -We will trial low-dose of anticholinergic  Ditropan XL 5 mg daily at bedtime  We will also refer to urogynecology    Follow-up in the office in 1 month, if no significant improvement in symptoms, may schedule appointment with urogyn

## 2023-02-14 NOTE — TELEPHONE ENCOUNTER
Patient at checkout today stated, " I no longer use [address]  Can Dr Kate Collado please send the oxybutynin 5 mg to her [facility] on [address]  "    I did cancel the earlier one that was sent to [facility]

## 2023-02-14 NOTE — ASSESSMENT & PLAN NOTE
Patient has been very busy cleaning ou her sister's home  she has had a flareup of low back pain  Located in the lumbar area, nonradiating, no associated numbness or tingling  She has a history of degenerative disc disease  She does feel the symptoms are slightly improving  Does not wish for me to send any medications to the pharmacy for this    Recommend she continue with activity as tolerated, ice/heat, topical analgesics

## 2023-02-28 ENCOUNTER — TELEPHONE (OUTPATIENT)
Dept: INTERNAL MEDICINE CLINIC | Facility: CLINIC | Age: 78
End: 2023-02-28

## 2023-02-28 DIAGNOSIS — M54.50 ACUTE BILATERAL LOW BACK PAIN WITHOUT SCIATICA: Primary | ICD-10-CM

## 2023-02-28 RX ORDER — CELECOXIB 100 MG/1
100 CAPSULE ORAL 2 TIMES DAILY PRN
Qty: 30 CAPSULE | Refills: 0 | Status: SHIPPED | OUTPATIENT
Start: 2023-02-28

## 2023-02-28 NOTE — TELEPHONE ENCOUNTER
Patient calling this afternoon and stated," I am calling Dr Salima Zimmerman with an update after taking oxybutynin 5 mg for 2 weeks now  It does seem to be helping me  I am also requesting a script be sent to my [facility] pharmacy on [facility] for Celebrex  He offered it to me at my appointment for my back pain    I have decided that I would like to try it  "

## 2023-03-14 ENCOUNTER — OFFICE VISIT (OUTPATIENT)
Dept: INTERNAL MEDICINE CLINIC | Facility: CLINIC | Age: 78
End: 2023-03-14

## 2023-03-14 VITALS
OXYGEN SATURATION: 97 % | SYSTOLIC BLOOD PRESSURE: 134 MMHG | DIASTOLIC BLOOD PRESSURE: 80 MMHG | HEART RATE: 82 BPM | RESPIRATION RATE: 18 BRPM | WEIGHT: 149 LBS | TEMPERATURE: 96.8 F | HEIGHT: 61 IN | BODY MASS INDEX: 28.13 KG/M2

## 2023-03-14 DIAGNOSIS — M54.50 ACUTE BILATERAL LOW BACK PAIN WITHOUT SCIATICA: ICD-10-CM

## 2023-03-14 DIAGNOSIS — N39.41 URGE INCONTINENCE: Primary | ICD-10-CM

## 2023-03-14 NOTE — ASSESSMENT & PLAN NOTE
Assessed for urinary incontinence at our prior appointment  Inadequate response to Kegel exercises  Appeared to be having mixed urge and stress urinary incontinence  -Prior ultrasound of the kidneys and bladder unremarkable, no significant postvoid residual  - urinalysis not consistent with infection  -Started on Ditropan XL 5 mg daily at bedtime  She has had significant improvement in her symptoms and seems to be satisfied with her current voiding habits  Does not appear to be having significant side effects    We will continue with current dose for now

## 2023-03-14 NOTE — PROGRESS NOTES
INTERNAL MEDICINE OFFICE VISIT  [facility]- [place]    NAME: Lang Fall  AGE: 69 y o  SEX: female    DATE OF ENCOUNTER: 3/14/2023    Assessment and Plan/History of Present Illness     Here today for 1 month follow-up  Medical history of vitreous detachment of the right eye, hemorrhoids status post hemorrhoidectomy, osteopenia, possible uterine cancer status post hysterectomy/oophorectomy, nicotine dependence       1  Urge incontinence  Assessment & Plan:  Assessed for urinary incontinence at our prior appointment  Inadequate response to Kegel exercises  Appeared to be having mixed urge and stress urinary incontinence  -Prior ultrasound of the kidneys and bladder unremarkable, no significant postvoid residual  - urinalysis not consistent with infection  -Started on Ditropan XL 5 mg daily at bedtime  She has had significant improvement in her symptoms and seems to be satisfied with her current voiding habits  Does not appear to be having significant side effects  We will continue with current dose for now          2  Acute bilateral low back pain without sciatica  Assessment & Plan:  Per my last office note 2/14/2023:  "Patient has been very busy cleaning ou her sister's home  she has had a flareup of low back pain  Located in the lumbar area, nonradiating, no associated numbness or tingling  She has a history of degenerative disc disease  She does feel the symptoms are slightly improving  Does not wish for me to send any medications to the pharmacy for this  Recommend she continue with activity as tolerated, ice/heat, topical analgesics"    Symptomatically much improved today  I sent patient prescription for as needed Celebrex in the interim which has helped                 No orders of the defined types were placed in this encounter        Chief Complaint     Chief Complaint   Patient presents with   • Follow-up     1 month        Review of Systems     10 point ROS negative except per HPI    The following portions of the patient's history were reviewed and updated as appropriate: allergies, current medications, past family history, past medical history, past social history, past surgical history and problem list     Active Problem List     Patient Active Problem List   Diagnosis   • History of uterine cancer   • Insomnia   • Cigarette nicotine dependence without complication   • Posterior vitreous detachment of right eye   • Abnormal mammogram   • Cobblestone retinal degeneration, right   • Hemorrhoids   • Elevated LDL cholesterol level   • Vitamin D deficiency   • Psoriasis   • Seasonal allergies   • Urinary incontinence   • Acute bilateral low back pain without sciatica   • Urge incontinence       Objective     /80   Pulse 82   Temp (!) 96 8 °F (36 °C)   Resp 18   Ht 5' 1" (1 549 m)   Wt 67 6 kg (149 lb)   SpO2 97%   BMI 28 15 kg/m²     Physical Exam  Constitutional:       Appearance: Normal appearance  She is not ill-appearing  HENT:      Head: Normocephalic and atraumatic  Eyes:      General: No scleral icterus  Right eye: No discharge  Left eye: No discharge  Cardiovascular:      Rate and Rhythm: Normal rate and regular rhythm  Heart sounds: No murmur heard  No friction rub  Pulmonary:      Effort: Pulmonary effort is normal       Breath sounds: Normal breath sounds  No wheezing or rales  Abdominal:      General: Abdomen is flat  There is no distension  Palpations: Abdomen is soft  Tenderness: There is no abdominal tenderness  Musculoskeletal:         General: No swelling or tenderness  Skin:     General: Skin is warm and dry  Findings: No erythema  Neurological:      Mental Status: She is alert  Mental status is at baseline  Motor: No weakness     Psychiatric:         Mood and Affect: Mood normal          Behavior: Behavior normal          Pertinent Laboratory/Diagnostic Studies:  US kidney and bladder with pvr    Result Date: 1/19/2023  Impression: Unremarkable sonographic appearance of the kidneys and urinary bladder  Workstation performed: [id number]       Images and diagnostics reviewed     Current Medications     Current Outpatient Medications:   •  ascorbic acid (VITAMIN C) 500 mg tablet, Take 500 mg by mouth daily  , Disp: , Rfl:   •  Calcium Citrate-Vitamin D (CALCIUM CITRATE + PO), Take 1 tablet by mouth daily  , Disp: , Rfl:   •  celecoxib (CeleBREX) 100 mg capsule, Take 1 capsule (100 mg total) by mouth 2 (two) times a day as needed for moderate pain, Disp: 30 capsule, Rfl: 0  •  Cholecalciferol (VITAMIN D-3 PO), Take 5,000 Units by mouth daily  , Disp: , Rfl:   •  clobetasol (TEMOVATE) 0 05 % external solution, APPLY TWICE A DAY TO SCALP X 2 WEEKS THEN REDUCE TO THREE TIMES A WEEK , Disp: , Rfl:   •  co-enzyme Q-10 50 MG capsule, Take 200 mg by mouth daily, Disp: , Rfl:   •  cyanocobalamin (VITAMIN B-12) 500 mcg tablet, Take 1,000 mcg by mouth daily, Disp: , Rfl:   •  Iron-Vitamin C 100-250 MG TABS, Take 1 tablet by mouth, Disp: , Rfl:   •  Multiple Vitamin (MULTIVITAMIN) tablet, Take 1 tablet by mouth daily  , Disp: , Rfl:   •  oxybutynin (DITROPAN-XL) 5 mg 24 hr tablet, Take 1 tablet (5 mg total) by mouth daily at bedtime, Disp: 90 tablet, Rfl: 0  •  Potassium 99 MG TABS, Take by mouth, Disp: , Rfl:   •  Turmeric 500 MG CAPS, Take by mouth, Disp: , Rfl:   •  vitamin E, tocopherol, 1,000 units capsule, Take 2,000 Units by mouth daily, Disp: , Rfl:   •  zinc gluconate 50 mg tablet, Take 50 mg by mouth daily  , Disp: , Rfl:     Health Maintenance     Health Maintenance   Topic Date Due   • Hepatitis C Screening  Never done   • Pneumococcal Vaccine: 65+ Years (1 - PCV) Never done   • Lung Cancer Screening  Never done   • Influenza Vaccine (1) 06/30/2023 (Originally 9/1/2022)   • Medicare Annual Wellness Visit (AWV)  07/11/2023   • BMI: Followup Plan  01/11/2024   • Fall Risk  02/14/2024   • Depression Screening 02/14/2024   • Urinary Incontinence Screening  02/14/2024   • BMI: Adult  03/14/2024   • Osteoporosis Screening  Completed   • COVID-19 Vaccine  Completed   • HIB Vaccine  Aged Out   • IPV Vaccine  Aged Out   • Hepatitis A Vaccine  Aged Out   • Meningococcal ACWY Vaccine  Aged Out   • HPV Vaccine  Aged Out   • Colorectal Cancer Screening  Discontinued     Immunization History   Administered Date(s) Administered   • COVID-19 PFIZER VACCINE 0 3 ML IM 02/26/2021, 03/19/2021, 10/01/2021   • COVID-19 Pfizer Vac BIVALENT Arash-sucrose 12 Yr+ IM (BOOSTER ONLY) 09/19/2022       LEWIS Neal  Internal Medicine [place] [address] [address], [facility] [address]  [place], [address] [facility]  Office: [phone]  Fax: [phone]

## 2023-03-14 NOTE — ASSESSMENT & PLAN NOTE
Per my last office note 2/14/2023:  "Patient has been very busy cleaning ou her sister's home  she has had a flareup of low back pain  Located in the lumbar area, nonradiating, no associated numbness or tingling  She has a history of degenerative disc disease  She does feel the symptoms are slightly improving  Does not wish for me to send any medications to the pharmacy for this  Recommend she continue with activity as tolerated, ice/heat, topical analgesics"    Symptomatically much improved today    I sent patient prescription for as needed Celebrex in the interim which has helped

## 2023-05-15 ENCOUNTER — TELEPHONE (OUTPATIENT)
Dept: INTERNAL MEDICINE CLINIC | Facility: CLINIC | Age: 78
End: 2023-05-15

## 2023-05-15 DIAGNOSIS — N39.41 URGE INCONTINENCE: ICD-10-CM

## 2023-05-15 RX ORDER — OXYBUTYNIN CHLORIDE 5 MG/1
5 TABLET, EXTENDED RELEASE ORAL
Qty: 90 TABLET | Refills: 1 | Status: SHIPPED | OUTPATIENT
Start: 2023-05-15

## 2023-05-15 NOTE — TELEPHONE ENCOUNTER
"Patient calling and stated,\" I need Dr Angelica Chacon to send to my  [facility] pharmacy oxybutynin 5 mg a 90 day  \"  "

## 2023-09-06 ENCOUNTER — RA CDI HCC (OUTPATIENT)
Dept: OTHER | Facility: HOSPITAL | Age: 78
End: 2023-09-06

## 2023-09-06 NOTE — PROGRESS NOTES
[address] coding opportunities       Chart reviewed, no opportunity found: [address] Review     Patients Insurance     Medicare Insurance: [employer]

## 2023-09-09 ENCOUNTER — RA CDI HCC (OUTPATIENT)
Dept: OTHER | Facility: HOSPITAL | Age: 78
End: 2023-09-09

## 2023-09-10 NOTE — PROGRESS NOTES
[address] [facility] coding opportunities       Chart reviewed, no opportunity found: CHART REVIEWED, [facility]     Patients Insurance     Medicare Insurance: [employer]

## 2023-09-13 NOTE — ASSESSMENT & PLAN NOTE
-Ongoing cessation encouraged  Down to half a pack of cigarettes a week. She makes her own cigarettes  -Will continue to address lung cancer screening.  Patient previously deferred

## 2023-09-14 ENCOUNTER — OFFICE VISIT (OUTPATIENT)
Dept: INTERNAL MEDICINE CLINIC | Facility: CLINIC | Age: 78
End: 2023-09-14
Payer: COMMERCIAL

## 2023-09-14 VITALS
HEIGHT: 61 IN | WEIGHT: 148 LBS | OXYGEN SATURATION: 94 % | BODY MASS INDEX: 27.94 KG/M2 | SYSTOLIC BLOOD PRESSURE: 130 MMHG | HEART RATE: 72 BPM | TEMPERATURE: 97.1 F | RESPIRATION RATE: 16 BRPM | DIASTOLIC BLOOD PRESSURE: 60 MMHG

## 2023-09-14 DIAGNOSIS — F17.210 CIGARETTE NICOTINE DEPENDENCE WITHOUT COMPLICATION: ICD-10-CM

## 2023-09-14 DIAGNOSIS — Z00.00 MEDICARE ANNUAL WELLNESS VISIT, SUBSEQUENT: Primary | ICD-10-CM

## 2023-09-14 DIAGNOSIS — E78.5 DYSLIPIDEMIA: ICD-10-CM

## 2023-09-14 DIAGNOSIS — N39.41 URGE INCONTINENCE: ICD-10-CM

## 2023-09-14 DIAGNOSIS — E55.9 VITAMIN D DEFICIENCY: ICD-10-CM

## 2023-09-14 DIAGNOSIS — M25.442 SWELLING OF FINGER JOINT OF LEFT HAND: ICD-10-CM

## 2023-09-14 DIAGNOSIS — E78.00 ELEVATED LDL CHOLESTEROL LEVEL: ICD-10-CM

## 2023-09-14 PROCEDURE — 99214 OFFICE O/P EST MOD 30 MIN: CPT | Performed by: INTERNAL MEDICINE

## 2023-09-14 PROCEDURE — G0439 PPPS, SUBSEQ VISIT: HCPCS | Performed by: INTERNAL MEDICINE

## 2023-09-14 RX ORDER — CELECOXIB 100 MG/1
100 CAPSULE ORAL 2 TIMES DAILY
Qty: 14 CAPSULE | Refills: 0 | Status: SHIPPED | OUTPATIENT
Start: 2023-09-14 | End: 2023-09-21

## 2023-09-14 NOTE — ASSESSMENT & PLAN NOTE
Mild swelling and redness of DIP joint of left ring finger. Patient states this has been present for a month. She states she did injure the finger on a kitchen drawer preceding her swelling. Mild tenderness to palpation on exam.  She denies any prior history of gout  -We will treat with 1 week course of anti-inflammatory, she previously responded well to Celebrex.   If no improvement, I advised her to go for x-rays of the hand

## 2023-09-14 NOTE — PATIENT INSTRUCTIONS
Medicare Preventive Visit Patient Instructions  Thank you for completing your Welcome to Medicare Visit or Medicare Annual Wellness Visit today. Your next wellness visit will be due in one year (9/14/2024). The screening/preventive services that you may require over the next 5-10 years are detailed below. Some tests may not apply to you based off risk factors and/or age. Screening tests ordered at today's visit but not completed yet may show as past due. Also, please note that scanned in results may not display below. Preventive Screenings:  Service Recommendations Previous Testing/Comments   Colorectal Cancer Screening  * Colonoscopy    * Fecal Occult Blood Test (FOBT)/Fecal Immunochemical Test (FIT)  * Fecal DNA/Cologuard Test  * Flexible Sigmoidoscopy Age: 43-73 years old   Colonoscopy: every 10 years (may be performed more frequently if at higher risk)  OR  FOBT/FIT: every 1 year  OR  Cologuard: every 3 years  OR  Sigmoidoscopy: every 5 years  Screening may be recommended earlier than age 39 if at higher risk for colorectal cancer. Also, an individualized decision between you and your healthcare provider will decide whether screening between the ages of 77-80 would be appropriate. Colonoscopy: Not on file  FOBT/FIT: Not on file  Cologuard: Not on file  Sigmoidoscopy: Not on file          Breast Cancer Screening Age: 36 years old  Frequency: every 1-2 years  Not required if history of left and right mastectomy Mammogram: 11/25/2022    Screening Current   Cervical Cancer Screening Between the ages of 21-29, pap smear recommended once every 3 years. Between the ages of 32-69, can perform pap smear with HPV co-testing every 5 years.    Recommendations may differ for women with a history of total hysterectomy, cervical cancer, or abnormal pap smears in past. Pap Smear: Not on file    Screening Not Indicated   Hepatitis C Screening Once for adults born between 1945 and 1965  More frequently in patients at high risk for Hepatitis C Hep C Antibody: Not on file        Diabetes Screening 1-2 times per year if you're at risk for diabetes or have pre-diabetes Fasting glucose: 94 mg/dL (1/5/2023)  A1C: 5.5 % (12/22/2021)  Screening Current   Cholesterol Screening Once every 5 years if you don't have a lipid disorder. May order more often based on risk factors. Lipid panel: 01/05/2023    Screening Current     Other Preventive Screenings Covered by Medicare:  1. Abdominal Aortic Aneurysm (AAA) Screening: covered once if your at risk. You're considered to be at risk if you have a family history of AAA. 2. Lung Cancer Screening: covers low dose CT scan once per year if you meet all of the following conditions: (1) Age 48-67; (2) No signs or symptoms of lung cancer; (3) Current smoker or have quit smoking within the last 15 years; (4) You have a tobacco smoking history of at least 20 pack years (packs per day multiplied by number of years you smoked); (5) You get a written order from a healthcare provider. 3. Glaucoma Screening: covered annually if you're considered high risk: (1) You have diabetes OR (2) Family history of glaucoma OR (3)  aged 48 and older OR (3)  American aged 72 and older  3. Osteoporosis Screening: covered every 2 years if you meet one of the following conditions: (1) You're estrogen deficient and at risk for osteoporosis based off medical history and other findings; (2) Have a vertebral abnormality; (3) On glucocorticoid therapy for more than 3 months; (4) Have primary hyperparathyroidism; (5) On osteoporosis medications and need to assess response to drug therapy. · Last bone density test (DXA Scan): 08/01/2022.  5. HIV Screening: covered annually if you're between the age of 15-65. Also covered annually if you are younger than 13 and older than 72 with risk factors for HIV infection. For pregnant patients, it is covered up to 3 times per pregnancy.     Immunizations:  Immunization Recommendations   Influenza Vaccine Annual influenza vaccination during flu season is recommended for all persons aged >= 6 months who do not have contraindications   Pneumococcal Vaccine   * Pneumococcal conjugate vaccine = PCV13 (Prevnar 13), PCV15 (Vaxneuvance), PCV20 (Prevnar 20)  * Pneumococcal polysaccharide vaccine = PPSV23 (Pneumovax) Adults 20-63 years old: 1-3 doses may be recommended based on certain risk factors  Adults 72 years old: 1-2 doses may be recommended based off what pneumonia vaccine you previously received   Hepatitis B Vaccine 3 dose series if at intermediate or high risk (ex: diabetes, end stage renal disease, liver disease)   Tetanus (Td) Vaccine - COST NOT COVERED BY MEDICARE PART B Following completion of primary series, a booster dose should be given every 10 years to maintain immunity against tetanus. Td may also be given as tetanus wound prophylaxis. Tdap Vaccine - COST NOT COVERED BY MEDICARE PART B Recommended at least once for all adults. For pregnant patients, recommended with each pregnancy. Shingles Vaccine (Shingrix) - COST NOT COVERED BY MEDICARE PART B  2 shot series recommended in those aged 48 and above     Health Maintenance Due:      Topic Date Due   • Hepatitis C Screening  Never done   • Lung Cancer Screening  Never done   • Colorectal Cancer Screening  Discontinued     Immunizations Due:      Topic Date Due   • Pneumococcal Vaccine: 65+ Years (1 - PCV) Never done   • COVID-19 Vaccine (5 - Pfizer series) 01/19/2023   • Influenza Vaccine (1) 09/01/2023     Advance Directives   What are advance directives? Advance directives are legal documents that state your wishes and plans for medical care. These plans are made ahead of time in case you lose your ability to make decisions for yourself. Advance directives can apply to any medical decision, such as the treatments you want, and if you want to donate organs. What are the types of advance directives?   There are many types of advance directives, and each state has rules about how to use them. You may choose a combination of any of the following:  · Living will: This is a written record of the treatment you want. You can also choose which treatments you do not want, which to limit, and which to stop at a certain time. This includes surgery, medicine, IV fluid, and tube feedings. · Durable power of  for healthcare [facility]): This is a written record that states who you want to make healthcare choices for you when you are unable to make them for yourself. This person, called a proxy, is usually a family member or a friend. You may choose more than 1 proxy. · Do not resuscitate (DNR) order:  A DNR order is used in case your heart stops beating or you stop breathing. It is a request not to have certain forms of treatment, such as CPR. A DNR order may be included in other types of advance directives. · Medical directive: This covers the care that you want if you are in a coma, near death, or unable to make decisions for yourself. You can list the treatments you want for each condition. Treatment may include pain medicine, surgery, blood transfusions, dialysis, IV or tube feedings, and a ventilator (breathing machine). · Values history: This document has questions about your views, beliefs, and how you feel and think about life. This information can help others choose the care that you would choose. Why are advance directives important? An advance directive helps you control your care. Although spoken wishes may be used, it is better to have your wishes written down. Spoken wishes can be misunderstood, or not followed. Treatments may be given even if you do not want them. An advance directive may make it easier for your family to make difficult choices about your care. Urinary Incontinence   Urinary incontinence (UI)  is when you lose control of your bladder.  UI develops because your bladder cannot store or empty urine properly. The 3 most common types of UI are stress incontinence, urge incontinence, or both. Medicines:   · May be given to help strengthen your bladder control. Report any side effects of medication to your healthcare provider. Do pelvic muscle exercises often:  Your pelvic muscles help you stop urinating. Squeeze these muscles tight for 5 seconds, then relax for 5 seconds. Gradually work up to squeezing for 10 seconds. Do 3 sets of 15 repetitions a day, or as directed. This will help strengthen your pelvic muscles and improve bladder control. Train your bladder:  Go to the bathroom at set times, such as every 2 hours, even if you do not feel the urge to go. You can also try to hold your urine when you feel the urge to go. For example, hold your urine for 5 minutes when you feel the urge to go. As that becomes easier, hold your urine for 10 minutes. Self-care:   · Keep a UI record. Write down how often you leak urine and how much you leak. Make a note of what you were doing when you leaked urine. · Drink liquids as directed. You may need to limit the amount of liquid you drink to help control your urine leakage. Do not drink any liquid right before you go to bed. Limit or do not have drinks that contain caffeine or alcohol. · Prevent constipation. Eat a variety of high-fiber foods. Good examples are high-fiber cereals, beans, vegetables, and whole-grain breads. Walking is the best way to trigger your intestines to have a bowel movement. · Exercise regularly and maintain a healthy weight. Weight loss and exercise will decrease pressure on your bladder and help you control your leakage. · Use a catheter as directed  to help empty your bladder. A catheter is a tiny, plastic tube that is put into your bladder to drain your urine. · Go to behavior therapy as directed. Behavior therapy may be used to help you learn to control your urge to urinate.     Cigarette Smoking and Your Health   Risks to your health if you smoke:  Nicotine and other chemicals found in tobacco damage every cell in your body. Even if you are a light smoker, you have an increased risk for cancer, heart disease, and lung disease. If you are pregnant or have diabetes, smoking increases your risk for complications. Benefits to your health if you stop smoking:   · You decrease respiratory symptoms such as coughing, wheezing, and shortness of breath. · You reduce your risk for cancers of the lung, mouth, throat, kidney, bladder, pancreas, stomach, and cervix. If you already have cancer, you increase the benefits of chemotherapy. You also reduce your risk for cancer returning or a second cancer from developing. · You reduce your risk for heart disease, blood clots, heart attack, and stroke. · You reduce your risk for lung infections, and diseases such as pneumonia, asthma, chronic bronchitis, and emphysema. · Your circulation improves. More oxygen can be delivered to your body. If you have diabetes, you lower your risk for complications, such as kidney, artery, and eye diseases. You also lower your risk for nerve damage. Nerve damage can lead to amputations, poor vision, and blindness. · You improve your body's ability to heal and to fight infections. For more information and support to stop smoking:   · [employer]  Phone: 3- 194 - 442-5600  Web Address: www.[employer]  Weight Management   Why it is important to manage your weight:  Being overweight increases your risk of health conditions such as heart disease, high blood pressure, type 2 diabetes, and certain types of cancer. It can also increase your risk for osteoarthritis, sleep apnea, and other respiratory problems. Aim for a slow, steady weight loss. Even a small amount of weight loss can lower your risk of health problems. How to lose weight safely:  A safe and healthy way to lose weight is to eat fewer calories and get regular exercise.  You can lose up about 1 pound a week by decreasing the number of calories you eat by 500 calories each day. Healthy meal plan for weight management:  A healthy meal plan includes a variety of foods, contains fewer calories, and helps you stay healthy. A healthy meal plan includes the following:  · Eat whole-grain foods more often. A healthy meal plan should contain fiber. Fiber is the part of grains, fruits, and vegetables that is not broken down by your body. Whole-grain foods are healthy and provide extra fiber in your diet. Some examples of whole-grain foods are whole-wheat breads and pastas, oatmeal, brown rice, and bulgur. · Eat a variety of vegetables every day. Include dark, leafy greens such as spinach, kale, dane greens, and mustard greens. Eat yellow and orange vegetables such as carrots, sweet potatoes, and winter squash. · Eat a variety of fruits every day. Choose fresh or canned fruit (canned in its own juice or light syrup) instead of juice. Fruit juice has very little or no fiber. · Eat low-fat dairy foods. Drink fat-free (skim) milk or 1% milk. Eat fat-free yogurt and low-fat cottage cheese. Try low-fat cheeses such as mozzarella and other reduced-fat cheeses. · Choose meat and other protein foods that are low in fat. Choose beans or other legumes such as split peas or lentils. Choose fish, skinless poultry (chicken or turkey), or lean cuts of red meat (beef or pork). Before you cook meat or poultry, cut off any visible fat. · Use less fat and oil. Try baking foods instead of frying them. Add less fat, such as margarine, sour cream, regular salad dressing and mayonnaise to foods. Eat fewer high-fat foods. Some examples of high-fat foods include french fries, doughnuts, ice cream, and cakes. · Eat fewer sweets. Limit foods and drinks that are high in sugar. This includes candy, cookies, regular soda, and sweetened drinks. Exercise:  Exercise at least 30 minutes per day on most days of the week.  Some examples of exercise include walking, biking, dancing, and swimming. You can also fit in more physical activity by taking the stairs instead of the elevator or parking farther away from stores. Ask your healthcare provider about the best exercise plan for you. © Copyright [employer] 2018 Information is for End User's use only and may not be sold, redistributed or otherwise used for commercial purposes.  All illustrations and images included in CareNotes® are the copyrighted property of [employer] or [address] [place] [address]

## 2023-09-14 NOTE — PROGRESS NOTES
Assessment and Plan:     Medical history of vitreous detachment of the right eye, hemorrhoids status post hemorrhoidectomy, osteopenia, possible uterine cancer status post hysterectomy/oophorectomy, nicotine dependence    Problem List Items Addressed This Visit        Other    Cigarette nicotine dependence without complication     -Ongoing cessation encouraged  Down to half a pack of cigarettes a week. She makes her own cigarettes  -Will continue to address lung cancer screening. Patient previously deferred            Elevated LDL cholesterol level     Improved on most recent lipid panel January 2023. We will recheck lipid panel with next of labs         Vitamin D deficiency     Continue supplementation          Relevant Orders    Vitamin D 25 hydroxy    Urge incontinence     Mixed urge and stress incontinence. Good response to Ditropan XL 5 mg daily at bedtime  -Continue current dose         Swelling of finger joint of left hand     Mild swelling and redness of DIP joint of left ring finger. Patient states this has been present for a month. She states she did injure the finger on a kitchen drawer preceding her swelling. Mild tenderness to palpation on exam.  She denies any prior history of gout  -We will treat with 1 week course of anti-inflammatory, she previously responded well to Celebrex. If no improvement, I advised her to go for x-rays of the hand         Relevant Medications    celecoxib (CeleBREX) 100 mg capsule    Other Relevant Orders    XR hand 3+ vw left   Other Visit Diagnoses     Medicare annual wellness visit, subsequent    -  Primary    Dyslipidemia        Relevant Orders    CBC and differential    Basic metabolic panel    Lipid panel           Preventive health issues were discussed with patient, and age appropriate screening tests were ordered as noted in patient's After Visit Summary.   Personalized health advice and appropriate referrals for health education or preventive services given if needed, as noted in patient's After Visit Summary.      History of Present Illness:     Patient presents for a Medicare Wellness Visit    HPI   Patient Care Team:  Kraig Hammond DO as PCP - General (Internal Medicine)     Review of Systems:     Review of Systems     Problem List:     Patient Active Problem List   Diagnosis   • History of uterine cancer   • Insomnia   • Cigarette nicotine dependence without complication   • Posterior vitreous detachment of right eye   • Abnormal mammogram   • Cobblestone retinal degeneration, right   • Hemorrhoids   • Elevated LDL cholesterol level   • Vitamin D deficiency   • Psoriasis   • Seasonal allergies   • Acute bilateral low back pain without sciatica   • Urge incontinence   • Swelling of finger joint of left hand      Past Medical and Surgical History:     Past Medical History:   Diagnosis Date   • History of uterine cancer    • Insomnia    • Lumbar disc disease      Past Surgical History:   Procedure Laterality Date   • APPENDECTOMY     • APPENDECTOMY     • BREAST CYST EXCISION Right 10/24/2007    benign   • BREAST CYST EXCISION Right 03/09/2016    benign   • BREAST LUMPECTOMY     • BREAST LUMPECTOMY Right 3/9/2016    Procedure:  RIGHT BREAST BIOPSY ,LUMPECTOMY BREAST NEEDLE LOCALIZED @ 1230 ;  Surgeon: Real Cruz MD;  Location: [facility];  Service:    • BREAST SURGERY      lumpectomy   • CATARACT EXTRACTION     • COLONOSCOPY     • HYSTERECTOMY      Age 26   • MAMMO NEEDLE LOCALIZATION RIGHT (ALL INC) Right 3/9/2016   • OOPHORECTOMY     • [place] HEMORRHOIDECTOMY INT & XTRNL 2/> COLUMN/KAELYN N/A 7/14/2021    Procedure: INTERNAL/EXTERNAL HEMORRHOIDECTOMY EXCISION;  Surgeon: Di Obregon DO;  Location: [address] [place] [address] MAIN OR;  Service: General   • TONSILLECTOMY        Family History:     Family History   Problem Relation Age of Onset   • Colon cancer Maternal Grandmother         age dx unknown   • Breast cancer Maternal Aunt 80   • No Known Problems Mother    • No Known Problems Father    • No Known Problems Sister    • No Known Problems Maternal Grandfather    • No Known Problems Paternal Grandmother    • No Known Problems Paternal Grandfather    • No Known Problems Sister    • No Known Problems Brother       Social History:     Social History     Socioeconomic History   • Marital status: /Civil Union     Spouse name: None   • Number of children: None   • Years of education: None   • Highest education level: None   Occupational History   • None   Tobacco Use   • Smoking status: Every Day     Packs/day: 0.50     Years: 55.00     Total pack years: 27.50     Types: Cigarettes     Start date: 12/29/1964   • Smokeless tobacco: Never   • Tobacco comments:     smoke one pack every two weeks   Vaping Use   • Vaping Use: Never used   Substance and Sexual Activity   • Alcohol use: Yes     Comment: Special occ   • Drug use: Yes   • Sexual activity: Not Currently   Other Topics Concern   • None   Social History Narrative   • None     Social Determinants of Health     Financial Resource Strain: Low Risk  (9/14/2023)    Overall Financial Resource Strain (CARDIA)    • Difficulty of Paying Living Expenses: Not hard at all   Food Insecurity: Not on file   Transportation Needs: No Transportation Needs (9/14/2023)    PRAPARE - Transportation    • Lack of Transportation (Medical): No    • Lack of Transportation (Non-Medical): No   Physical Activity: Not on file   Stress: Not on file   Social Connections: Not on file   Intimate Partner Violence: Not on file   Housing Stability: Not on file      Medications and Allergies:     Current Outpatient Medications   Medication Sig Dispense Refill   • ascorbic acid (VITAMIN C) 500 mg tablet Take 500 mg by mouth daily. • Calcium Citrate-Vitamin D (CALCIUM CITRATE + PO) Take 1 tablet by mouth daily.      • celecoxib (CeleBREX) 100 mg capsule Take 1 capsule (100 mg total) by mouth 2 (two) times a day for 7 days 14 capsule 0   • Cholecalciferol (VITAMIN D-3 PO) Take 5,000 Units by mouth daily. • clobetasol (TEMOVATE) 0.05 % external solution APPLY TWICE A DAY TO SCALP X 2 WEEKS THEN REDUCE TO THREE TIMES A WEEK. • co-enzyme Q-10 50 MG capsule Take 200 mg by mouth daily     • cyanocobalamin (VITAMIN B-12) 500 mcg tablet Take 1,000 mcg by mouth daily     • Iron-Vitamin C 100-250 MG TABS Take 1 tablet by mouth     • Multiple Vitamin (MULTIVITAMIN) tablet Take 1 tablet by mouth daily. • oxybutynin (DITROPAN-XL) 5 mg 24 hr tablet Take 1 tablet (5 mg total) by mouth daily at bedtime 90 tablet 1   • Potassium 99 MG TABS Take by mouth     • Turmeric 500 MG CAPS Take by mouth     • vitamin E, tocopherol, 1,000 units capsule Take 2,000 Units by mouth daily     • zinc gluconate 50 mg tablet Take 50 mg by mouth daily. No current facility-administered medications for this visit. Allergies   Allergen Reactions   • Codeine Hives   • Morphine And Related Other (See Comments)     Hallucinations and vomiting. Pt reports being sensative to most   • Other Anaphylaxis, GI Intolerance, Shortness Of Breath and Rash     All Seafood, Banana, mushrooms, tomatoes, watermelon  With touching tomatoes   • Penicillins Anaphylaxis   • Ciprofloxacin Hives     Rash, vomiting      Immunizations:     Immunization History   Administered Date(s) Administered   • COVID-19 PFIZER VACCINE 0.3 ML IM 02/26/2021, 03/19/2021, 10/01/2021   • COVID-19 Pfizer Vac BIVALENT Arash-sucrose 12 Yr+ IM 09/19/2022      Health Maintenance:         Topic Date Due   • Hepatitis C Screening  Never done   • Lung Cancer Screening  Never done   • Colorectal Cancer Screening  Discontinued         Topic Date Due   • Pneumococcal Vaccine: 65+ Years (1 - PCV) Never done   • COVID-19 Vaccine (5 - Pfizer series) 01/19/2023   • Influenza Vaccine (1) 09/01/2023      Medicare Screening Tests and Risk Assessments: Radha Archuleta is here for her Subsequent Wellness visit.      Health Risk Assessment:   Patient rates overall health as excellent. Patient feels that their physical health rating is same. Patient is very satisfied with their life. Eyesight was rated as same. Hearing was rated as same. Patient feels that their emotional and mental health rating is same. Patients states they are never, rarely angry. Patient states they are never, rarely unusually tired/fatigued. Pain experienced in the last 7 days has been none. Patient states that she has experienced no weight loss or gain in last 6 months. Fall Risk Screening: In the past year, patient has experienced: no history of falling in past year      Urinary Incontinence Screening:   Patient has leaked urine accidently in the last six months. Home Safety:  Patient does not have trouble with stairs inside or outside of their home. Patient has working smoke alarms and has working carbon monoxide detector. Home safety hazards include: none. Medications:   Patient is currently taking over-the-counter supplements. OTC medications include: see medication list. Patient is able to manage medications. Activities of Daily Living (ADLs)/Instrumental Activities of Daily Living (IADLs):   Walk and transfer into and out of bed and chair?: Yes  Dress and groom yourself?: Yes    Bathe or shower yourself?: Yes    Feed yourself? Yes  Do your laundry/housekeeping?: Yes  Manage your money, pay your bills and track your expenses?: Yes  Make your own meals?: Yes    Do your own shopping?: Yes    Previous Hospitalizations:   Any hospitalizations or ED visits within the last 12 months?: No      Advance Care Planning:   Living will: No    Durable POA for healthcare:  Yes    Advanced directive: No      PREVENTIVE SCREENINGS      Cardiovascular Screening:    General: Screening Current      Diabetes Screening:     General: Screening Current      Breast Cancer Screening:     General: Screening Current      Cervical Cancer Screening:    General: Screening Not Indicated    Screening, Brief Intervention, and Referral to Treatment (SBIRT)    Screening  Typical number of drinks in a day: 0  Typical number of drinks in a week: 0  Interpretation: Low risk drinking behavior. Single Item Drug Screening:  How often have you used an illegal drug (including marijuana) or a prescription medication for non-medical reasons in the past year? never    Single Item Drug Screen Score: 0  Interpretation: Negative screen for possible drug use disorder    No results found.      Physical Exam:     /60 (BP Location: Left arm, Patient Position: Sitting, Cuff Size: Standard)   Pulse 72   Temp (!) 97.1 °F (36.2 °C)   Resp 16   Ht 5' 1" (1.549 m)   Wt 67.1 kg (148 lb)   SpO2 94%   BMI 27.96 kg/m²     Physical Exam     Max Phil Lopez, DO

## 2023-09-14 NOTE — ASSESSMENT & PLAN NOTE
Mixed urge and stress incontinence.   Good response to Ditropan XL 5 mg daily at bedtime  -Continue current dose

## 2023-10-03 ENCOUNTER — HOSPITAL ENCOUNTER (OUTPATIENT)
Dept: RADIOLOGY | Facility: HOSPITAL | Age: 78
Discharge: HOME | End: 2023-10-03
Payer: COMMERCIAL

## 2023-10-03 DIAGNOSIS — M25.442 SWELLING OF FINGER JOINT OF LEFT HAND: ICD-10-CM

## 2023-10-03 PROCEDURE — 73130 X-RAY EXAM OF HAND: CPT

## 2023-11-12 DIAGNOSIS — N39.41 URGE INCONTINENCE: ICD-10-CM

## 2023-11-13 RX ORDER — OXYBUTYNIN CHLORIDE 5 MG/1
5 TABLET, EXTENDED RELEASE ORAL
Qty: 90 TABLET | Refills: 0 | Status: SHIPPED | OUTPATIENT
Start: 2023-11-13

## 2023-11-24 ENCOUNTER — OFFICE VISIT (OUTPATIENT)
Dept: INTERNAL MEDICINE CLINIC | Facility: CLINIC | Age: 78
End: 2023-11-24
Payer: COMMERCIAL

## 2023-11-24 VITALS
SYSTOLIC BLOOD PRESSURE: 136 MMHG | DIASTOLIC BLOOD PRESSURE: 68 MMHG | TEMPERATURE: 97.7 F | BODY MASS INDEX: 28.13 KG/M2 | WEIGHT: 149 LBS | HEIGHT: 61 IN | OXYGEN SATURATION: 98 % | HEART RATE: 61 BPM

## 2023-11-24 DIAGNOSIS — J40 BRONCHITIS: Primary | Chronic | ICD-10-CM

## 2023-11-24 DIAGNOSIS — F17.210 CIGARETTE NICOTINE DEPENDENCE WITHOUT COMPLICATION: ICD-10-CM

## 2023-11-24 DIAGNOSIS — R05.9 COUGH, UNSPECIFIED TYPE: ICD-10-CM

## 2023-11-24 DIAGNOSIS — R09.81 NASAL CONGESTION: ICD-10-CM

## 2023-11-24 LAB
SARS-COV-2 AG UPPER RESP QL IA: NEGATIVE
VALID CONTROL: NORMAL

## 2023-11-24 PROCEDURE — 87811 SARS-COV-2 COVID19 W/OPTIC: CPT | Performed by: STUDENT IN AN ORGANIZED HEALTH CARE EDUCATION/TRAINING PROGRAM

## 2023-11-24 PROCEDURE — 99214 OFFICE O/P EST MOD 30 MIN: CPT | Performed by: STUDENT IN AN ORGANIZED HEALTH CARE EDUCATION/TRAINING PROGRAM

## 2023-11-24 RX ORDER — AZITHROMYCIN 250 MG/1
TABLET, FILM COATED ORAL
Qty: 6 TABLET | Refills: 0 | Status: SHIPPED | OUTPATIENT
Start: 2023-11-24 | End: 2023-11-29

## 2023-11-24 RX ORDER — DEXTROMETHORPHAN HYDROBROMIDE AND PROMETHAZINE HYDROCHLORIDE 15; 6.25 MG/5ML; MG/5ML
5 SYRUP ORAL 4 TIMES DAILY PRN
Qty: 180 ML | Refills: 1 | Status: SHIPPED | OUTPATIENT
Start: 2023-11-24

## 2023-11-24 NOTE — PROGRESS NOTES
INTERNAL MEDICINE OFFICE VISIT NOTE  [facility] Internal Medicine [place]    NAME: Marco Hensley  AGE: 69 y.o. SEX: female    [address]: 11/24/2023       Chief Complaint   Patient presents with    Cough     ongoing cough/congestion 2 1/2 wks, Covid negative         Review of Systems  10 point ROS negative except per HPI    OBJECTIVE:  Vitals:    11/24/23 1444   BP: 136/68   BP Location: Left arm   Patient Position: Sitting   Cuff Size: Standard   Pulse: 61   Temp: 97.7 °F (36.5 °C)   SpO2: 98%   Weight: 67.6 kg (149 lb)   Height: 5' 1" (1.549 m)       Physical Exam:   GENERAL: NAD, Non diaphoretic, non-toxic, not ill-appearing  NEUROLOGIC:  Alert/oriented x3. HEENT:  NC/AT, EOMI, MMM, no scleral icterus  CARDIAC:  RRR, +S1/S2, no m/g/r  PULMONARY:  non-labored breathing, CTA B/L, no wheezing/rales/rhonci appreciated at time of encounter. Extremities:  No edema, 2+ Pulses in DP/PT. ASSESSMENT/PLAN:    1.  Bronchitis  Assessment & Plan:  Presents for evaluation of persistent cough that is at times productive, postnasal drip, and chest congestion for about 2 weeks and a half weeks -cough is worse at night when supine  Denies sore throat, rhinorrhea, chest tightness, chest pain, fever, chills, other systemic symptoms, sinus pressure or pain  -COVID testing with home antigen test and in our office has been negative  -History of cigarette smoking and difficulty in exposure to secondhand smoking from her  and friend putting her at risk of some underlying airway reactivity  -She is unable to recall an upper respiratory infection within the mentioned timeframe however it is likely this was all initiated by a mild URI and now she is experiencing the sequela as bronchitis  -We have agreed on starting azithromycin-Z-Eric ordered, she does have a history of some allergies to antibiotics however azithromycin is not listed as an allergy nor it was noted in her medication history  -We will send a prescription for promethazine DM  -Smoking cessation was advised  -Patient advised to monitor symptoms for worsening/complications and to contact our office or seek medical care in the emergency room if symptoms are severe      Orders:  -     promethazine-dextromethorphan (PHENERGAN-DM) 6.25-15 mg/5 mL oral syrup; Take 5 mL by mouth 4 (four) times a day as needed for cough  -     azithromycin (Zithromax) 250 mg tablet; Take 2 tablets (500 mg total) by mouth daily for 1 day, THEN 1 tablet (250 mg total) daily for 4 days. 2. Cough, unspecified type  -     POCT Rapid Covid Ag    3. Nasal congestion  -     POCT Rapid Covid Ag    4. Cigarette nicotine dependence without complication  Assessment & Plan:  Reports a long history of cigarette smoking, estimates half a pack per week, which puts her at risk for lung disease  Now presenting with acute bronchitis  We will can sensation was advised. She is currently in the precontemplative stage  Continue follow-up during future visits            Current Outpatient Medications:     ascorbic acid (VITAMIN C) 500 mg tablet, Take 500 mg by mouth daily. , Disp: , Rfl:     azithromycin (Zithromax) 250 mg tablet, Take 2 tablets (500 mg total) by mouth daily for 1 day, THEN 1 tablet (250 mg total) daily for 4 days. , Disp: 6 tablet, Rfl: 0    Calcium Citrate-Vitamin D (CALCIUM CITRATE + PO), Take 1 tablet by mouth daily. , Disp: , Rfl:     celecoxib (CeleBREX) 100 mg capsule, Take 1 capsule (100 mg total) by mouth 2 (two) times a day for 7 days, Disp: 14 capsule, Rfl: 0    Cholecalciferol (VITAMIN D-3 PO), Take 5,000 Units by mouth daily. , Disp: , Rfl:     clobetasol (TEMOVATE) 0.05 % external solution, APPLY TWICE A DAY TO SCALP X 2 WEEKS THEN REDUCE TO THREE TIMES A WEEK., Disp: , Rfl:     co-enzyme Q-10 50 MG capsule, Take 200 mg by mouth daily, Disp: , Rfl:     cyanocobalamin (VITAMIN B-12) 500 mcg tablet, Take 1,000 mcg by mouth daily, Disp: , Rfl:     Iron-Vitamin C 100-250 MG TABS, Take 1 tablet by mouth, Disp: , Rfl:     Multiple Vitamin (MULTIVITAMIN) tablet, Take 1 tablet by mouth daily. , Disp: , Rfl:     oxybutynin (DITROPAN-XL) 5 mg 24 hr tablet, TAKE 1 TABLET BY MOUTH AT BEDTIME, Disp: 90 tablet, Rfl: 0    Potassium 99 MG TABS, Take by mouth, Disp: , Rfl:     promethazine-dextromethorphan (PHENERGAN-DM) 6.25-15 mg/5 mL oral syrup, Take 5 mL by mouth 4 (four) times a day as needed for cough, Disp: 180 mL, Rfl: 1    Turmeric 500 MG CAPS, Take by mouth, Disp: , Rfl:     vitamin E, tocopherol, 1,000 units capsule, Take 2,000 Units by mouth daily, Disp: , Rfl:     zinc gluconate 50 mg tablet, Take 50 mg by mouth daily. , Disp: , Rfl:     TCM Call       None          TCM Call       None               MD [facility]    * Please Note: This note was completed in part utilizing a dictation software. Grammatical errors, random word insertions, spelling mistakes, and incomplete sentences may be an occasional consequence of this system secondary to software limitations, ambient noise, and hardware issues. If you have any questions or concerns about the content, text, or information contained within the body of this dictation, please contact the provider for clarification. **

## 2023-11-24 NOTE — ASSESSMENT & PLAN NOTE
Reports a long history of cigarette smoking, estimates half a pack per week, which puts her at risk for lung disease  Now presenting with acute bronchitis  We will can sensation was advised.   She is currently in the precontemplative stage  Continue follow-up during future visits

## 2023-11-24 NOTE — ASSESSMENT & PLAN NOTE
Presents for evaluation of persistent cough that is at times productive, postnasal drip, and chest congestion for about 2 weeks and a half weeks -cough is worse at night when supine  Denies sore throat, rhinorrhea, chest tightness, chest pain, fever, chills, other systemic symptoms, sinus pressure or pain  -COVID testing with home antigen test and in our office has been negative  -History of cigarette smoking and difficulty in exposure to secondhand smoking from her  and friend putting her at risk of some underlying airway reactivity  -She is unable to recall an upper respiratory infection within the mentioned timeframe however it is likely this was all initiated by a mild URI and now she is experiencing the sequela as bronchitis  -We have agreed on starting azithromycin-Z-Eric ordered, she does have a history of some allergies to antibiotics however azithromycin is not listed as an allergy nor it was noted in her medication history  -We will send a prescription for promethazine DM  -Smoking cessation was advised  -Patient advised to monitor symptoms for worsening/complications and to contact our office or seek medical care in the emergency room if symptoms are severe

## 2023-12-13 ENCOUNTER — HOSPITAL ENCOUNTER (OUTPATIENT)
Dept: MAMMOGRAPHY | Facility: CLINIC | Age: 78
Discharge: HOME | End: 2023-12-13
Payer: COMMERCIAL

## 2023-12-13 VITALS — WEIGHT: 149 LBS | HEIGHT: 61 IN | BODY MASS INDEX: 28.13 KG/M2

## 2023-12-13 DIAGNOSIS — Z12.31 ENCOUNTER FOR SCREENING MAMMOGRAM FOR MALIGNANT NEOPLASM OF BREAST: ICD-10-CM

## 2023-12-13 DIAGNOSIS — Z12.39 ENCOUNTER FOR SCREENING FOR MALIGNANT NEOPLASM OF BREAST, UNSPECIFIED SCREENING MODALITY: ICD-10-CM

## 2023-12-13 PROCEDURE — 77063 BREAST TOMOSYNTHESIS BI: CPT

## 2023-12-13 PROCEDURE — 77067 SCR MAMMO BI INCL CAD: CPT

## 2023-12-21 ENCOUNTER — TELEMEDICINE (OUTPATIENT)
Dept: INTERNAL MEDICINE CLINIC | Facility: CLINIC | Age: 78
End: 2023-12-21
Payer: COMMERCIAL

## 2023-12-21 VITALS — HEIGHT: 61 IN | BODY MASS INDEX: 28.15 KG/M2

## 2023-12-21 DIAGNOSIS — U07.1 COVID-19 VIRUS INFECTION: Primary | ICD-10-CM

## 2023-12-21 DIAGNOSIS — J40 BRONCHITIS: Chronic | ICD-10-CM

## 2023-12-21 PROCEDURE — 99214 OFFICE O/P EST MOD 30 MIN: CPT | Performed by: STUDENT IN AN ORGANIZED HEALTH CARE EDUCATION/TRAINING PROGRAM

## 2023-12-21 RX ORDER — DEXTROMETHORPHAN HYDROBROMIDE AND PROMETHAZINE HYDROCHLORIDE 15; 6.25 MG/5ML; MG/5ML
5 SYRUP ORAL 4 TIMES DAILY PRN
Qty: 180 ML | Refills: 1 | Status: SHIPPED | OUTPATIENT
Start: 2023-12-21

## 2023-12-21 RX ORDER — NIRMATRELVIR AND RITONAVIR 300-100 MG
3 KIT ORAL 2 TIMES DAILY
Qty: 30 TABLET | Refills: 0 | Status: SHIPPED | OUTPATIENT
Start: 2023-12-21 | End: 2023-12-26

## 2023-12-21 NOTE — ASSESSMENT & PLAN NOTE
Positive COVID test today with home Ag test  Symptoms started 2 days ago with nasal congestion and runny nose. + chills yesterday but not today. 1 episode of loose BM yesterday.   Woke up this morning with worse cough that is productive + Chest tightness but no SOB or wheezing. +general malaise, poor appetite.   Denies sore throat, n/v/d/abd pain, urinary symptoms   -Considering her medical history and age we have agreed on proceeding with Paxlovid after reviewing adverse effect profile.   - Low suspicion for superimposed bacterial infection hence no indication for abx at this time.  -May use Tylenol or ibuprofen for general malaise  -Promethazine-DM for cough.   - Patient advised on isolation precautions and to monitor symptoms for worsening/complications and to contact our office or seek medical care in the emergency room if symptoms are severe

## 2023-12-21 NOTE — PROGRESS NOTES
Virtual Regular Visit    Verification of patient location:    Patient is located at Home in the following state in which I hold an active license [place]      Assessment/Plan:    Problem List Items Addressed This Visit          Other    COVID-19 virus infection - Primary     Positive COVID test today with home Ag test  Symptoms started 2 days ago with nasal congestion and runny nose. + chills yesterday but not today. 1 episode of loose BM yesterday.   Woke up this morning with worse cough that is productive + Chest tightness but no SOB or wheezing. +general malaise, poor appetite.   Denies sore throat, n/v/d/abd pain, urinary symptoms   -Considering her medical history and age we have agreed on proceeding with Paxlovid after reviewing adverse effect profile.   -May use Tylenol or ibuprofen for general malaise  -Promethazine-DM for cough.   - Patient advised on isolation precautions and to monitor symptoms for worsening/complications and to contact our office or seek medical care in the emergency room if symptoms are severe           Relevant Medications    nirmatrelvir & ritonavir (Paxlovid, 300/100,) tablet therapy pack    promethazine-dextromethorphan (PHENERGAN-DM) 6.25-15 mg/5 mL oral syrup           Reason for visit is   Chief Complaint   Patient presents with    COVID-19     COVID Positive  cough runny nose     Virtual Regular Visit          Encounter provider Anil Flynn MD    Provider located at [address] [place] [address] INTERNAL MEDICINE [address] [address]      Recent Visits  No visits were found meeting these conditions.  Showing recent visits within past 7 days and meeting all other requirements  Today's Visits  Date Type Provider Dept   12/21/23 Telemedicine Anil Flynn MD  Internal Med [place]   Showing today's visits and meeting all other requirements  Future Appointments  No visits were found meeting these conditions.  Showing future appointments  within next 150 days and meeting all other requirements       The patient was identified by name and date of birth. Kathryn Ramsey was informed that this is a telemedicine visit and that the visit is being conducted through the Epic Embedded platform. She agrees to proceed..  My office door was closed. No one else was in the room.  She acknowledged consent and understanding of privacy and security of the video platform. The patient has agreed to participate and understands they can discontinue the visit at any time.    Patient is aware this is a billable service.     Subjective  Kathryn Ramsey is a 78 y.o. female  .      HPI     Past Medical History:   Diagnosis Date    History of uterine cancer     Insomnia     Lumbar disc disease        Past Surgical History:   Procedure Laterality Date    APPENDECTOMY      APPENDECTOMY      BREAST CYST EXCISION Right 10/24/2007    benign    BREAST CYST EXCISION Right 03/09/2016    benign    BREAST LUMPECTOMY      BREAST LUMPECTOMY Right 3/9/2016    Procedure:  RIGHT BREAST BIOPSY ,LUMPECTOMY BREAST NEEDLE LOCALIZED @ 1230 ;  Surgeon: Gem Masters MD;  Location: [facility];  Service:     BREAST SURGERY      lumpectomy    CATARACT EXTRACTION      COLONOSCOPY      HYSTERECTOMY      Age 23    MAMMO NEEDLE LOCALIZATION RIGHT (ALL INC) Right 3/9/2016    OOPHORECTOMY      [place] HEMORRHOIDECTOMY INT & XTRNL 2/> COLUMN/KAELYN N/A 7/14/2021    Procedure: INTERNAL/EXTERNAL HEMORRHOIDECTOMY EXCISION;  Surgeon: Riley Washington DO;  Location:  MAIN OR;  Service: General    TONSILLECTOMY         Current Outpatient Medications   Medication Sig Dispense Refill    ascorbic acid (VITAMIN C) 500 mg tablet Take 500 mg by mouth daily.      Calcium Citrate-Vitamin D (CALCIUM CITRATE + PO) Take 1 tablet by mouth daily.      celecoxib (CeleBREX) 100 mg capsule Take 1 capsule (100 mg total) by mouth 2 (two) times a day for 7 days 14 capsule 0    Cholecalciferol (VITAMIN D-3 PO) Take 5,000 Units by  "mouth daily.      clobetasol (TEMOVATE) 0.05 % external solution APPLY TWICE A DAY TO SCALP X 2 WEEKS THEN REDUCE TO THREE TIMES A WEEK.      co-enzyme Q-10 50 MG capsule Take 200 mg by mouth daily      cyanocobalamin (VITAMIN B-12) 500 mcg tablet Take 1,000 mcg by mouth daily      Iron-Vitamin C 100-250 MG TABS Take 1 tablet by mouth      Multiple Vitamin (MULTIVITAMIN) tablet Take 1 tablet by mouth daily.      nirmatrelvir & ritonavir (Paxlovid, 300/100,) tablet therapy pack Take 3 tablets by mouth 2 (two) times a day for 5 days Take 2 nirmatrelvir tablets + 1 ritonavir tablet together per dose. GFR 72. 30 tablet 0    oxybutynin (DITROPAN-XL) 5 mg 24 hr tablet TAKE 1 TABLET BY MOUTH AT BEDTIME 90 tablet 0    Potassium 99 MG TABS Take by mouth      promethazine-dextromethorphan (PHENERGAN-DM) 6.25-15 mg/5 mL oral syrup Take 5 mL by mouth 4 (four) times a day as needed for cough 180 mL 1    Turmeric 500 MG CAPS Take by mouth      vitamin E, tocopherol, 1,000 units capsule Take 2,000 Units by mouth daily      zinc gluconate 50 mg tablet Take 50 mg by mouth daily.       No current facility-administered medications for this visit.        Allergies   Allergen Reactions    Codeine Hives    Morphine And Related Other (See Comments)     Hallucinations and vomiting. Pt reports being sensative to most    Other Anaphylaxis, GI Intolerance, Shortness Of Breath and Rash     All Seafood, Banana, mushrooms, tomatoes, watermelon  With touching tomatoes    Penicillins Anaphylaxis    Ciprofloxacin Hives     Rash, vomiting       Review of Systems    Video Exam    Vitals:    12/21/23 1407   Height: 5' 1\" (1.549 m)       Physical Exam     Visit Time  Total Visit Duration: 10        "

## 2024-02-18 DIAGNOSIS — N39.41 URGE INCONTINENCE: ICD-10-CM

## 2024-02-18 RX ORDER — OXYBUTYNIN CHLORIDE 5 MG/1
5 TABLET, EXTENDED RELEASE ORAL
Qty: 90 TABLET | Refills: 0 | Status: SHIPPED | OUTPATIENT
Start: 2024-02-18

## 2024-02-29 ENCOUNTER — RA CDI HCC (OUTPATIENT)
Dept: OTHER | Facility: HOSPITAL | Age: 79
End: 2024-02-29

## 2024-03-06 ENCOUNTER — APPOINTMENT (OUTPATIENT)
Dept: LAB | Facility: HOSPITAL | Age: 79
End: 2024-03-06
Payer: COMMERCIAL

## 2024-03-06 DIAGNOSIS — E78.5 DYSLIPIDEMIA: ICD-10-CM

## 2024-03-06 DIAGNOSIS — E55.9 VITAMIN D DEFICIENCY: ICD-10-CM

## 2024-03-06 LAB
25(OH)D3 SERPL-MCNC: 51.3 NG/ML (ref 30–100)
ANION GAP SERPL CALCULATED.3IONS-SCNC: 7 MMOL/L
BASOPHILS # BLD AUTO: 0.05 THOUSANDS/ÂΜL (ref 0–0.1)
BASOPHILS NFR BLD AUTO: 1 % (ref 0–1)
BUN SERPL-MCNC: 16 MG/DL (ref 5–25)
CALCIUM SERPL-MCNC: 9.2 MG/DL (ref 8.4–10.2)
CHLORIDE SERPL-SCNC: 104 MMOL/L (ref 96–108)
CHOLEST SERPL-MCNC: 190 MG/DL
CO2 SERPL-SCNC: 29 MMOL/L (ref 21–32)
CREAT SERPL-MCNC: 0.81 MG/DL (ref 0.6–1.3)
EOSINOPHIL # BLD AUTO: 0.12 THOUSAND/ÂΜL (ref 0–0.61)
EOSINOPHIL NFR BLD AUTO: 2 % (ref 0–6)
ERYTHROCYTE [DISTWIDTH] IN BLOOD BY AUTOMATED COUNT: 13.5 % (ref 11.6–15.1)
GFR SERPL CREATININE-BSD FRML MDRD: 70 ML/MIN/1.73SQ M
GLUCOSE P FAST SERPL-MCNC: 87 MG/DL (ref 65–99)
HCT VFR BLD AUTO: 43.9 % (ref 34.8–46.1)
HDLC SERPL-MCNC: 65 MG/DL
HGB BLD-MCNC: 14.3 G/DL (ref 11.5–15.4)
IMM GRANULOCYTES # BLD AUTO: 0.01 THOUSAND/UL (ref 0–0.2)
IMM GRANULOCYTES NFR BLD AUTO: 0 % (ref 0–2)
LDLC SERPL CALC-MCNC: 102 MG/DL (ref 0–100)
LYMPHOCYTES # BLD AUTO: 1.77 THOUSANDS/ÂΜL (ref 0.6–4.47)
LYMPHOCYTES NFR BLD AUTO: 28 % (ref 14–44)
MCH RBC QN AUTO: 30.4 PG (ref 26.8–34.3)
MCHC RBC AUTO-ENTMCNC: 32.6 G/DL (ref 31.4–37.4)
MCV RBC AUTO: 93 FL (ref 82–98)
MONOCYTES # BLD AUTO: 0.4 THOUSAND/ÂΜL (ref 0.17–1.22)
MONOCYTES NFR BLD AUTO: 6 % (ref 4–12)
NEUTROPHILS # BLD AUTO: 3.94 THOUSANDS/ÂΜL (ref 1.85–7.62)
NEUTS SEG NFR BLD AUTO: 63 % (ref 43–75)
NONHDLC SERPL-MCNC: 125 MG/DL
NRBC BLD AUTO-RTO: 0 /100 WBCS
PLATELET # BLD AUTO: 265 THOUSANDS/UL (ref 149–390)
PMV BLD AUTO: 9.8 FL (ref 8.9–12.7)
POTASSIUM SERPL-SCNC: 4.7 MMOL/L (ref 3.5–5.3)
RBC # BLD AUTO: 4.71 MILLION/UL (ref 3.81–5.12)
SODIUM SERPL-SCNC: 140 MMOL/L (ref 135–147)
TRIGL SERPL-MCNC: 116 MG/DL
WBC # BLD AUTO: 6.29 THOUSAND/UL (ref 4.31–10.16)

## 2024-03-06 PROCEDURE — 85025 COMPLETE CBC W/AUTO DIFF WBC: CPT

## 2024-03-06 PROCEDURE — 82306 VITAMIN D 25 HYDROXY: CPT

## 2024-03-06 PROCEDURE — 80048 BASIC METABOLIC PNL TOTAL CA: CPT

## 2024-03-06 PROCEDURE — 36415 COLL VENOUS BLD VENIPUNCTURE: CPT

## 2024-03-06 PROCEDURE — 80061 LIPID PANEL: CPT

## 2024-03-09 ENCOUNTER — RA CDI HCC (OUTPATIENT)
Dept: OTHER | Facility: HOSPITAL | Age: 79
End: 2024-03-09

## 2024-03-13 ENCOUNTER — OFFICE VISIT (OUTPATIENT)
Dept: INTERNAL MEDICINE CLINIC | Facility: CLINIC | Age: 79
End: 2024-03-13
Payer: COMMERCIAL

## 2024-03-13 VITALS
SYSTOLIC BLOOD PRESSURE: 140 MMHG | WEIGHT: 148.4 LBS | BODY MASS INDEX: 28.02 KG/M2 | TEMPERATURE: 97.6 F | OXYGEN SATURATION: 95 % | HEIGHT: 61 IN | HEART RATE: 68 BPM | DIASTOLIC BLOOD PRESSURE: 80 MMHG

## 2024-03-13 DIAGNOSIS — E78.00 ELEVATED LDL CHOLESTEROL LEVEL: ICD-10-CM

## 2024-03-13 DIAGNOSIS — F17.210 CIGARETTE NICOTINE DEPENDENCE WITHOUT COMPLICATION: ICD-10-CM

## 2024-03-13 DIAGNOSIS — M25.561 ACUTE PAIN OF RIGHT KNEE: ICD-10-CM

## 2024-03-13 DIAGNOSIS — R03.0 ELEVATED BLOOD PRESSURE READING IN OFFICE WITHOUT DIAGNOSIS OF HYPERTENSION: ICD-10-CM

## 2024-03-13 DIAGNOSIS — Z72.0 TOBACCO ABUSE: Primary | ICD-10-CM

## 2024-03-13 DIAGNOSIS — Z63.6 CAREGIVER STRESS: ICD-10-CM

## 2024-03-13 DIAGNOSIS — M25.551 ACUTE PAIN OF RIGHT HIP: ICD-10-CM

## 2024-03-13 DIAGNOSIS — N39.41 URGE INCONTINENCE: ICD-10-CM

## 2024-03-13 PROBLEM — U07.1 COVID-19 VIRUS INFECTION: Status: RESOLVED | Noted: 2023-12-21 | Resolved: 2024-03-13

## 2024-03-13 PROCEDURE — G2211 COMPLEX E/M VISIT ADD ON: HCPCS | Performed by: INTERNAL MEDICINE

## 2024-03-13 PROCEDURE — 99214 OFFICE O/P EST MOD 30 MIN: CPT | Performed by: INTERNAL MEDICINE

## 2024-03-13 RX ORDER — FERROUS SULFATE 15 MG/ML
DROPS ORAL
COMMUNITY

## 2024-03-13 RX ORDER — FEXOFENADINE HCL AND PSEUDOEPHEDRINE HCI 180; 240 MG/1; MG/1
TABLET, EXTENDED RELEASE ORAL
COMMUNITY

## 2024-03-13 RX ORDER — TURMERIC 400 MG
CAPSULE ORAL
COMMUNITY

## 2024-03-13 RX ORDER — SODIUM PHOSPHATE,MONO-DIBASIC 19G-7G/118
ENEMA (ML) RECTAL
COMMUNITY

## 2024-03-13 SDOH — SOCIAL STABILITY - SOCIAL INSECURITY: DEPENDENT RELATIVE NEEDING CARE AT HOME: Z63.6

## 2024-03-13 NOTE — PROGRESS NOTES
"INTERNAL MEDICINE OFFICE VISIT  [facility] Internal Medicine- [place]    NAME: Kathryn Ramsey  AGE: 78 y.o. SEX: female    DATE OF ENCOUNTER: 3/13/2024    Assessment and Plan/History of Present Illness     Here today for follow up  Medical history of vitreous detachment of the right eye, hemorrhoids status post hemorrhoidectomy, osteopenia, possible uterine cancer status post hysterectomy/oophorectomy, nicotine dependence       1. Tobacco abuse    2. Cigarette nicotine dependence without complication  Assessment & Plan:  -Ongoing cessation encouraged  -Will continue to address lung cancer screening. Patient previously deferred         3. Elevated LDL cholesterol level  Assessment & Plan:  Stable, mild elevation in LDL  Not currently on statin  Continue to monitor       4. Caregiver stress  Assessment & Plan:  Significant stressors at home regarding care of her sister.  She seems to be coping okay at this time.  Not currently interested in pharmacotherapy or counseling      5. Elevated blood pressure reading in office without diagnosis of hypertension  Assessment & Plan:  /80 today in the office.  She closely monitors BP at home.  On a \"normal day\" will have pressures around 120/60-70, occasionally will have elevated BP closer to BP today in the office  -Will hold off on antihypertensives at this time.  She will continue to monitor at home      6. Urge incontinence  Assessment & Plan:  Mixed urge and stress incontinence.  Previously had good response to Ditropan XL 5 mg daily at bedtime.  Spoke with her gynecologist who suggested discontinuation over concerns for side effects.  Not currently on medication, seems that Kegel exercises have helped        7. Acute pain of right knee  -     XR knee 3 vw right non injury; Future; Expected date: 03/13/2024    8. Acute pain of right hip  -     XR hip/pelv 2-3 vws right if performed; Future; Expected date: 03/13/2024               Orders Placed This Encounter " "  Procedures   • XR knee 3 vw right non injury   • XR hip/pelv 2-3 vws right if performed         Chief Complaint     Chief Complaint   Patient presents with   • Follow-up     Labs        Review of Systems     10 point ROS negative except per HPI    The following portions of the patient's history were reviewed and updated as appropriate: allergies, current medications, past family history, past medical history, past social history, past surgical history and problem list.    Active Problem List     Patient Active Problem List   Diagnosis   • History of uterine cancer   • Insomnia   • Cigarette nicotine dependence without complication   • Posterior vitreous detachment of right eye   • Abnormal mammogram   • Cobblestone retinal degeneration, right   • Hemorrhoids   • Elevated LDL cholesterol level   • Vitamin D deficiency   • Psoriasis   • Seasonal allergies   • Acute bilateral low back pain without sciatica   • Urge incontinence   • Swelling of finger joint of left hand   • Caregiver stress   • Elevated blood pressure reading in office without diagnosis of hypertension       Objective     /80 (BP Location: Left arm, Patient Position: Sitting)   Pulse 68   Temp 97.6 °F (36.4 °C) (Tympanic)   Ht 5' 1\" (1.549 m)   Wt 67.3 kg (148 lb 6.4 oz)   SpO2 95%   BMI 28.04 kg/m²     Physical Exam  Cardiovascular:      Rate and Rhythm: Normal rate and regular rhythm.      Heart sounds: Normal heart sounds. No murmur heard.  Pulmonary:      Effort: Pulmonary effort is normal.      Breath sounds: Normal breath sounds. No wheezing or rales.         Pertinent Laboratory/Diagnostic Studies:  Mammo screening bilateral w 3d & cad    Result Date: 12/13/2023  Impression: No mammographic evidence of malignancy. ASSESSMENT/BI-RADS CATEGORY: Left: 1 - Negative Right: 1 - Negative Overall: 1 - Negative RECOMMENDATION:      - Routine screening mammogram in 1 year for both breasts. Workstation ID: [id number]       Images and " diagnostics reviewed     Current Medications     Current Outpatient Medications:   •  ascorbic acid (VITAMIN C) 500 mg tablet, Take 500 mg by mouth daily., Disp: , Rfl:   •  Calcium Citrate-Vitamin D (CALCIUM CITRATE + PO), Take 1 tablet by mouth daily., Disp: , Rfl:   •  Cholecalciferol (VITAMIN D-3 PO), Take 5,000 Units by mouth daily., Disp: , Rfl:   •  clobetasol (TEMOVATE) 0.05 % external solution, APPLY TWICE A DAY TO SCALP X 2 WEEKS THEN REDUCE TO THREE TIMES A WEEK., Disp: , Rfl:   •  co-enzyme Q-10 50 MG capsule, Take 200 mg by mouth daily, Disp: , Rfl:   •  cyanocobalamin (VITAMIN B-12) 500 mcg tablet, Take 1,000 mcg by mouth daily, Disp: , Rfl:   •  fexofenadine-pseudoephedrine (Allegra-D Allergy & Congestion) 180-240 MG per 24 hr tablet, Take by mouth, Disp: , Rfl:   •  Glucosamine-Chondroitin 500-400 MG CAPS, Take by mouth, Disp: , Rfl:   •  Iron, Ferrous Sulfate, 75 (15 Fe) MG/ML SOLN, Take by mouth, Disp: , Rfl:   •  Iron-Vitamin C 100-250 MG TABS, Take 1 tablet by mouth, Disp: , Rfl:   •  Multiple Vitamin (MULTIVITAMIN) tablet, Take 1 tablet by mouth daily., Disp: , Rfl:   •  Potassium 99 MG TABS, Take by mouth, Disp: , Rfl:   •  Turmeric 400 MG CAPS, Take by mouth, Disp: , Rfl:   •  vitamin E, tocopherol, 1,000 units capsule, Take 2,000 Units by mouth daily, Disp: , Rfl:   •  zinc gluconate 50 mg tablet, Take 50 mg by mouth daily., Disp: , Rfl:   •  celecoxib (CeleBREX) 100 mg capsule, Take 1 capsule (100 mg total) by mouth 2 (two) times a day for 7 days (Patient not taking: Reported on 3/13/2024), Disp: 14 capsule, Rfl: 0    Health Maintenance     Health Maintenance   Topic Date Due   • Hepatitis C Screening  Never done   • Pneumococcal Vaccine: 65+ Years (1 of 2 - PCV) Never done   • Lung Cancer Screening  Never done   • Zoster Vaccine (1 of 2) Never done   • Influenza Vaccine (1) Never done   • COVID-19 Vaccine (5 - 2023-24 season) 09/01/2023   • Fall Risk  09/14/2024   • Urinary Incontinence  Screening  09/14/2024   • Medicare Annual Wellness Visit (AWV)  09/14/2024   • Depression Screening  03/13/2025   • Osteoporosis Screening  Completed   • HIB Vaccine  Aged Out   • IPV Vaccine  Aged Out   • Hepatitis A Vaccine  Aged Out   • Meningococcal ACWY Vaccine  Aged Out   • HPV Vaccine  Aged Out   • Colorectal Cancer Screening  Discontinued     Immunization History   Administered Date(s) Administered   • COVID-19 PFIZER VACCINE 0.3 ML IM 02/26/2021, 03/19/2021, 10/01/2021   • COVID-19 Pfizer Vac BIVALENT Arash-sucrose 12 Yr+ IM 09/19/2022       Kraig Gunn D.O.  [facility] Internal Medicine - [address]  [place] [address]  Office: [phone]  Fax: [phone]

## 2024-03-13 NOTE — ASSESSMENT & PLAN NOTE
-Ongoing cessation encouraged  -Will continue to address lung cancer screening. Patient previously deferred

## 2024-03-13 NOTE — ASSESSMENT & PLAN NOTE
"/80 today in the office.  She closely monitors BP at home.  On a \"normal day\" will have pressures around 120/60-70, occasionally will have elevated BP closer to BP today in the office  -Will hold off on antihypertensives at this time.  She will continue to monitor at home  "

## 2024-03-13 NOTE — ASSESSMENT & PLAN NOTE
Mixed urge and stress incontinence.  Previously had good response to Ditropan XL 5 mg daily at bedtime.  Spoke with her gynecologist who suggested discontinuation over concerns for side effects.  Not currently on medication, seems that Kegel exercises have helped

## 2024-03-13 NOTE — ASSESSMENT & PLAN NOTE
Significant stressors at home regarding care of her sister.  She seems to be coping okay at this time.  Not currently interested in pharmacotherapy or counseling

## 2024-05-01 ENCOUNTER — OFFICE VISIT (OUTPATIENT)
Dept: INTERNAL MEDICINE CLINIC | Facility: CLINIC | Age: 79
End: 2024-05-01
Payer: COMMERCIAL

## 2024-05-01 VITALS
DIASTOLIC BLOOD PRESSURE: 70 MMHG | SYSTOLIC BLOOD PRESSURE: 150 MMHG | RESPIRATION RATE: 18 BRPM | WEIGHT: 150 LBS | BODY MASS INDEX: 28.32 KG/M2 | HEART RATE: 77 BPM | HEIGHT: 61 IN | OXYGEN SATURATION: 97 % | TEMPERATURE: 97.5 F

## 2024-05-01 DIAGNOSIS — T78.2XXA ANAPHYLAXIS, INITIAL ENCOUNTER: Primary | ICD-10-CM

## 2024-05-01 DIAGNOSIS — M54.50 ACUTE BILATERAL LOW BACK PAIN WITHOUT SCIATICA: ICD-10-CM

## 2024-05-01 PROCEDURE — 1159F MED LIST DOCD IN RCRD: CPT | Performed by: INTERNAL MEDICINE

## 2024-05-01 PROCEDURE — 1160F RVW MEDS BY RX/DR IN RCRD: CPT | Performed by: INTERNAL MEDICINE

## 2024-05-01 PROCEDURE — G2211 COMPLEX E/M VISIT ADD ON: HCPCS | Performed by: INTERNAL MEDICINE

## 2024-05-01 PROCEDURE — 99214 OFFICE O/P EST MOD 30 MIN: CPT | Performed by: INTERNAL MEDICINE

## 2024-05-01 RX ORDER — EPINEPHRINE 0.3 MG/.3ML
0.3 INJECTION SUBCUTANEOUS ONCE
Qty: 0.6 ML | Refills: 0 | Status: SHIPPED | OUTPATIENT
Start: 2024-05-01 | End: 2024-05-01

## 2024-05-01 RX ORDER — CELECOXIB 100 MG/1
100 CAPSULE ORAL 2 TIMES DAILY PRN
Qty: 30 CAPSULE | Refills: 0 | Status: SHIPPED | OUTPATIENT
Start: 2024-05-01

## 2024-05-01 NOTE — PROGRESS NOTES
"INTERNAL MEDICINE OFFICE VISIT  [facility] Internal Medicine- [place]    NAME: Kathryn Ramsey  AGE: 78 y.o. SEX: female    DATE OF ENCOUNTER: 5/2/2024    Assessment and Plan/History of Present Illness     Here today for follow up  Medical history of vitreous detachment of the right eye, hemorrhoids status post hemorrhoidectomy, osteopenia, possible uterine cancer status post hysterectomy/oophorectomy, nicotine dependence      Fall/Back Pain  -Approximately 2 weeks ago patient tripped over her cat at home and fell forward into a kitchen chair.  Kitchen chair hit the right lower rib area.  She initially had discomfort in the right ribs though this has subsided.  No residual tenderness, no respiratory difficulties.  She is now having pain in the right lumbar area.  Tender to palpation.  Nonradiating.  No muscle weakness, paresthesias, bowel or bladder incontinence.  Muscle strength full and symmetric on exam.  No gross sensory deficits  -Continue with supportive care, prescription for Celebrex sent to pharmacy per patient request.  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  She states that when she eats anything with mushrooms her throat will close up, she will \"turn blue and cannot breathe\"  -She will be taking a trip to [place] shortly with her  and requests EpiPen to have on hand in case she accidentally ingests any foods containing mushrooms there  -Prescription for EpiPen sent to pharmacy        1. Anaphylaxis, initial encounter  -     EPINEPHrine (EPIPEN) 0.3 mg/0.3 mL SOAJ; Inject 0.3 mL (0.3 mg total) into a muscle once for 1 dose    2. Acute bilateral low back pain without sciatica  -     celecoxib (CeleBREX) 100 mg capsule; Take 1 capsule (100 mg total) by mouth 2 (two) times a day as needed for moderate pain               No orders of the defined types were placed in this encounter.      Chief " "Complaint     Chief Complaint   Patient presents with   • Follow-up     Discuss Medication  Epi pen for mushroom allergy (traveling to [place]) // pt refused pneumo zoster          Review of Systems     10 point ROS negative except per HPI    The following portions of the patient's history were reviewed and updated as appropriate: allergies, current medications, past family history, past medical history, past social history, past surgical history and problem list.    Active Problem List     Patient Active Problem List   Diagnosis   • History of uterine cancer   • Insomnia   • Cigarette nicotine dependence without complication   • Posterior vitreous detachment of right eye   • Abnormal mammogram   • Cobblestone retinal degeneration, right   • Hemorrhoids   • Elevated LDL cholesterol level   • Vitamin D deficiency   • Psoriasis   • Seasonal allergies   • Acute bilateral low back pain without sciatica   • Urge incontinence   • Swelling of finger joint of left hand   • Caregiver stress   • Elevated blood pressure reading in office without diagnosis of hypertension       Objective     /70 (BP Location: Left arm, Patient Position: Sitting, Cuff Size: Standard)   Pulse 77   Temp 97.5 °F (36.4 °C)   Resp 18   Ht 5' 1\" (1.549 m)   Wt 68 kg (150 lb)   SpO2 97%   BMI 28.34 kg/m²     Physical Exam  Musculoskeletal:         General: Tenderness present.   Neurological:      Sensory: No sensory deficit.      Motor: No weakness.         Pertinent Laboratory/Diagnostic Studies:  Mammo screening bilateral w 3d & cad    Result Date: 12/13/2023  Impression: No mammographic evidence of malignancy. ASSESSMENT/BI-RADS CATEGORY: Left: 1 - Negative Right: 1 - Negative Overall: 1 - Negative RECOMMENDATION:      - Routine screening mammogram in 1 year for both breasts. Workstation ID: [id number]       Images and diagnostics reviewed     Current Medications     Current Outpatient Medications:   •  ascorbic acid (VITAMIN C) 500 " mg tablet, Take 500 mg by mouth daily., Disp: , Rfl:   •  Calcium Citrate-Vitamin D (CALCIUM CITRATE + PO), Take 1 tablet by mouth daily., Disp: , Rfl:   •  celecoxib (CeleBREX) 100 mg capsule, Take 1 capsule (100 mg total) by mouth 2 (two) times a day as needed for moderate pain, Disp: 30 capsule, Rfl: 0  •  Cholecalciferol (VITAMIN D-3 PO), Take 5,000 Units by mouth daily., Disp: , Rfl:   •  clobetasol (TEMOVATE) 0.05 % external solution, APPLY TWICE A DAY TO SCALP X 2 WEEKS THEN REDUCE TO THREE TIMES A WEEK., Disp: , Rfl:   •  co-enzyme Q-10 50 MG capsule, Take 200 mg by mouth daily, Disp: , Rfl:   •  cyanocobalamin (VITAMIN B-12) 500 mcg tablet, Take 1,000 mcg by mouth daily, Disp: , Rfl:   •  EPINEPHrine (EPIPEN) 0.3 mg/0.3 mL SOAJ, Inject 0.3 mL (0.3 mg total) into a muscle once for 1 dose, Disp: 0.6 mL, Rfl: 0  •  fexofenadine-pseudoephedrine (Allegra-D Allergy & Congestion) 180-240 MG per 24 hr tablet, Take by mouth, Disp: , Rfl:   •  Glucosamine-Chondroitin 500-400 MG CAPS, Take by mouth, Disp: , Rfl:   •  Iron, Ferrous Sulfate, 75 (15 Fe) MG/ML SOLN, Take by mouth, Disp: , Rfl:   •  Iron-Vitamin C 100-250 MG TABS, Take 1 tablet by mouth, Disp: , Rfl:   •  Multiple Vitamin (MULTIVITAMIN) tablet, Take 1 tablet by mouth daily., Disp: , Rfl:   •  Potassium 99 MG TABS, Take by mouth, Disp: , Rfl:   •  Turmeric 400 MG CAPS, Take by mouth, Disp: , Rfl:   •  vitamin E, tocopherol, 1,000 units capsule, Take 2,000 Units by mouth daily, Disp: , Rfl:   •  zinc gluconate 50 mg tablet, Take 50 mg by mouth daily., Disp: , Rfl:     Health Maintenance     Health Maintenance   Topic Date Due   • Hepatitis C Screening  Never done   • Pneumococcal Vaccine: 65+ Years (1 of 2 - PCV) Never done   • Lung Cancer Screening  Never done   • Zoster Vaccine (1 of 2) Never done   • COVID-19 Vaccine (5 - 2023-24 season) 09/01/2023   • Influenza Vaccine (Season Ended) 09/01/2024   • Medicare Annual Wellness Visit (AWV)  09/14/2024   •  Fall Risk  03/13/2025   • Depression Screening  03/13/2025   • Urinary Incontinence Screening  03/13/2025   • Osteoporosis Screening  Completed   • HIB Vaccine  Aged Out   • IPV Vaccine  Aged Out   • Hepatitis A Vaccine  Aged Out   • Meningococcal ACWY Vaccine  Aged Out   • HPV Vaccine  Aged Out   • Colorectal Cancer Screening  Discontinued     Immunization History   Administered Date(s) Administered   • COVID-19 PFIZER VACCINE 0.3 ML IM 02/26/2021, 03/19/2021, 10/01/2021   • COVID-19 Pfizer Vac BIVALENT Arash-sucrose 12 Yr+ IM 09/19/2022       Kraig Gunn D.O.  [facility] Internal Medicine - [address]  [place] [address]  Office: [phone]  Fax: [phone]

## 2024-09-09 ENCOUNTER — RA CDI HCC (OUTPATIENT)
Dept: OTHER | Facility: HOSPITAL | Age: 79
End: 2024-09-09

## 2024-09-13 ENCOUNTER — OFFICE VISIT (OUTPATIENT)
Dept: INTERNAL MEDICINE CLINIC | Facility: CLINIC | Age: 79
End: 2024-09-13
Payer: COMMERCIAL

## 2024-09-13 VITALS
WEIGHT: 149 LBS | RESPIRATION RATE: 16 BRPM | HEART RATE: 80 BPM | TEMPERATURE: 97.2 F | OXYGEN SATURATION: 91 % | BODY MASS INDEX: 28.13 KG/M2 | HEIGHT: 61 IN | SYSTOLIC BLOOD PRESSURE: 120 MMHG | DIASTOLIC BLOOD PRESSURE: 80 MMHG

## 2024-09-13 DIAGNOSIS — E78.00 ELEVATED LDL CHOLESTEROL LEVEL: ICD-10-CM

## 2024-09-13 DIAGNOSIS — N39.41 URGE INCONTINENCE: ICD-10-CM

## 2024-09-13 DIAGNOSIS — R79.9 ABNORMAL FINDING OF BLOOD CHEMISTRY, UNSPECIFIED: ICD-10-CM

## 2024-09-13 DIAGNOSIS — E55.9 VITAMIN D DEFICIENCY: ICD-10-CM

## 2024-09-13 DIAGNOSIS — H81.11 BENIGN PAROXYSMAL POSITIONAL VERTIGO OF RIGHT EAR: Primary | ICD-10-CM

## 2024-09-13 PROCEDURE — G2211 COMPLEX E/M VISIT ADD ON: HCPCS | Performed by: INTERNAL MEDICINE

## 2024-09-13 PROCEDURE — 99214 OFFICE O/P EST MOD 30 MIN: CPT | Performed by: INTERNAL MEDICINE

## 2024-09-13 RX ORDER — OXYBUTYNIN CHLORIDE 5 MG/1
5 TABLET, EXTENDED RELEASE ORAL DAILY
Qty: 90 TABLET | Refills: 1 | Status: SHIPPED | OUTPATIENT
Start: 2024-09-13

## 2024-09-13 NOTE — PROGRESS NOTES
Ambulatory Visit  Name: Kathryn Ramsey      : 1945      MRN: [id number]  Encounter Provider: Kraig Gunn DO  Encounter Date: 2024   Encounter department: [facility] INTERNAL MEDICINE [place]    Medical history of vitreous detachment of the right eye, hemorrhoids status post hemorrhoidectomy, osteopenia, possible uterine cancer status post hysterectomy/oophorectomy, nicotine dependence    Assessment & Plan  Benign paroxysmal positional vertigo of right ear  Patient reports approximately 1 month history of intermittent vertigo.  Described as a room spinning sensation.  Can be provoked by laying down a certain way or after standing up or looking upwards.  No associated hearing loss, fullness of the ear, tinnitus, nausea, vomiting.  Episodes can last 2 or 3 seconds, sometimes up to 3 to 4 minutes    Positive right-sided Judie-Hallpike maneuver today with vertical nystagmus noted    Symptoms appear to be consistent with BPPV.  We reviewed pathophysiology today.  Provided with home Epley maneuver handout.  Also recommend evaluation for vestibular therapy.  Referral placed    Orders:    Ambulatory Referral to Physical Therapy; Future    Urge incontinence  Mixed urge and stress incontinence.  Previously had good response to Ditropan XL 5 mg daily at bedtime.  Spoke with her gynecologist who suggested discontinuation over concerns for side effects  -Patient reports worsening of her incontinence symptoms and wishes to restart medication today  -Restart Ditropan XL 5 mg daily at bedtime.  Reviewed side effects to watch for including anticholinergic effects. She did not previously have any adverse effects from the medication       Orders:    oxybutynin (DITROPAN-XL) 5 mg 24 hr tablet; Take 1 tablet (5 mg total) by mouth daily    Vitamin D deficiency    Orders:    Vitamin D 25 hydroxy; Future    Elevated LDL cholesterol level    Orders:    CBC and differential; Future    Comprehensive metabolic panel;  "Future    Lipid Panel with Direct LDL reflex; Future    Hemoglobin A1C; Future    Abnormal finding of blood chemistry, unspecified    Orders:    Hemoglobin A1C; Future       History of Present Illness     HPI      Review of Systems        Objective     /80 (BP Location: Left arm, Patient Position: Sitting, Cuff Size: Standard)   Pulse 80   Temp (!) 97.2 °F (36.2 °C)   Resp 16   Ht 5' 1\" (1.549 m)   Wt 67.6 kg (149 lb)   SpO2 91%   BMI 28.15 kg/m²     Physical Exam  Cardiovascular:      Rate and Rhythm: Normal rate and regular rhythm.      Heart sounds: No murmur heard.  Pulmonary:      Effort: Pulmonary effort is normal.      Breath sounds: Normal breath sounds. No wheezing or rales.   Neurological:      Comments: Positive right-sided Judie-Hallpike maneuver           "

## 2024-09-13 NOTE — ASSESSMENT & PLAN NOTE
Orders:    CBC and differential; Future    Comprehensive metabolic panel; Future    Lipid Panel with Direct LDL reflex; Future    Hemoglobin A1C; Future

## 2024-09-13 NOTE — ASSESSMENT & PLAN NOTE
Mixed urge and stress incontinence.  Previously had good response to Ditropan XL 5 mg daily at bedtime.  Spoke with her gynecologist who suggested discontinuation over concerns for side effects  -Patient reports worsening of her incontinence symptoms and wishes to restart medication today  -Restart Ditropan XL 5 mg daily at bedtime.  Reviewed side effects to watch for including anticholinergic effects. She did not previously have any adverse effects from the medication       Orders:    oxybutynin (DITROPAN-XL) 5 mg 24 hr tablet; Take 1 tablet (5 mg total) by mouth daily

## 2024-09-13 NOTE — ASSESSMENT & PLAN NOTE
Patient reports approximately 1 month history of intermittent vertigo.  Described as a room spinning sensation.  Can be provoked by laying down a certain way or after standing up or looking upwards.  No associated hearing loss, fullness of the ear, tinnitus, nausea, vomiting.  Episodes can last 2 or 3 seconds, sometimes up to 3 to 4 minutes    Positive right-sided [place]-Hallpike maneuver today with vertical nystagmus noted    Symptoms appear to be consistent with BPPV.  We reviewed pathophysiology today.  Provided with home Epley maneuver handout.  Also recommend evaluation for vestibular therapy.  Referral placed    Orders:    Ambulatory Referral to Physical Therapy; Future

## 2024-09-27 ENCOUNTER — EVALUATION (OUTPATIENT)
Dept: PHYSICAL THERAPY | Facility: CLINIC | Age: 79
End: 2024-09-27
Payer: COMMERCIAL

## 2024-09-27 DIAGNOSIS — H81.11 BENIGN PAROXYSMAL POSITIONAL VERTIGO OF RIGHT EAR: ICD-10-CM

## 2024-09-27 PROCEDURE — 97140 MANUAL THERAPY 1/> REGIONS: CPT | Performed by: PHYSICAL THERAPIST

## 2024-09-27 PROCEDURE — 97161 PT EVAL LOW COMPLEX 20 MIN: CPT | Performed by: PHYSICAL THERAPIST

## 2024-09-27 NOTE — PROGRESS NOTES
PT Evaluation     Today's date: 2024  Patient name: Kathryn Ramsey  : 1945  MRN: [id number]  Referring provider: Kraig Gunn DO  Dx:   Encounter Diagnosis     ICD-10-CM    1. Benign paroxysmal positional vertigo of right ear  H81.11 Ambulatory Referral to Physical Therapy     PT plan of care cert/re-cert          Start Time: 1115  Stop Time: 1200  Total time in clinic (min): 45 minutes    Assessment  Impairments: impaired balance, lacks appropriate home exercise program, unable to perform ADL and participation limitations  Symptom irritability: low    Assessment details: Pt is a 79 y.o. year old female presenting to physical therapy for R-sided BPPV.  She presents with the following impairments: diplopia during R smooth pursuit, mild dizziness with R rotation and extension, (+) R Judie-Hallpike affecting their function with turning head, walking, sleeping, rolling in bed, reclining in recliner, walking in grocery store, and completing ADLs. Patient tolerated R Epley maneuver well with minimal vertigo reported during second attempt. Pt will benefit from skilled physical therapy with R posterior canal maneuver to address functional limitations noted in evaluation and meet patient goals.    Evaluation completed by SPT Alexys Link under supervision from Froylan Thomas DPT.        Understanding of Dx/Px/POC: good     Prognosis: good    Goals  STG  1.Patient will demonstrate independence with HEP  2. Patient will be able to roll in bed without reproduction of vertigo  3. Patient will be able to walk with family and in grocery store without dizziness      Plan  Patient would benefit from: PT eval and skilled physical therapy    Planned therapy interventions: patient/caregiver education, manual therapy and canalith repositioning    Frequency: 1x week  Duration in weeks: 1    Subjective Evaluation    History of Present Illness  Mechanism of injury: Patient reports years of dizziness symptoms that come  and ho, symptoms have lingered and worsened over the past 2  months. Patient described dizziness symptoms as room spinning when laying down, turning head to R, and when reclining in recliner. Patient reports dizziness symptoms while walking, pt reports having to hold onto daughter or husbands arm for stability while walking. Patient reports that she works as an [employer]  and is worried that she will get dizzy while driving. Patient reports not taking ny medications and take supplemental vitamins. Patient reports hx of cataract surgery years ago and 20/20 vision since. Patient denies falls, denies headaches, denies lightheadedness, denies visual deficits.   Quality of life: good        Objective     Concurrent Complaints  Negative for headaches    Active Range of Motion   Cervical/Thoracic Spine       Cervical    Flexion: Neck active flexion: WFL.   Extension: Neck active extension: WFL.      Left lateral flexion: Neck active lateral bend left: WFL.      Right lateral flexion: Neck active lateral bend right: WFL.      Left rotation: Neck active rotation left: WFL.  Right rotation: Neck active rotation right: WFL.     Neuro Exam:     Dizziness  Positive for vertigo.   Negative for light-headedness and diplopia.     Exacerbating factors  Positive for looking up, walking, turning head and supine to/from sitting.     Symptoms   Duration: 2 months    Headaches   Patient reports headaches: No.     Oculomotor exam   Oculomotor ROM: WNL  Smooth pursuits: within normal limits  Horizontal saccades: normal  Head thrust: left normal and right normal    Positional testing   Judie-Hallpike   Left posterior canal: WNL  Right posterior canal: symptomatic and torsional             Precautions: N/A      Manuals 9/27            Judie-Hallpike (+) R            Epley ND R                                      Neuro Re-Ed 9/27                                                                                                       Ther Ex 9/27                                                                                                                     Ther Activity 9/27                                      Gait Training                                       Modalities

## 2024-10-08 ENCOUNTER — OFFICE VISIT (OUTPATIENT)
Dept: PHYSICAL THERAPY | Facility: CLINIC | Age: 79
End: 2024-10-08
Payer: COMMERCIAL

## 2024-10-08 DIAGNOSIS — H81.11 BENIGN PAROXYSMAL POSITIONAL VERTIGO OF RIGHT EAR: Primary | ICD-10-CM

## 2024-10-08 PROCEDURE — 97140 MANUAL THERAPY 1/> REGIONS: CPT | Performed by: PHYSICAL THERAPIST

## 2024-10-08 NOTE — PROGRESS NOTES
Discharge     Today's date: 10/8/2024  Patient name: Kathryn Ramsey  : 1945  MRN: [id number]  Referring provider: Kraig Gunn DO  Dx:   Encounter Diagnosis     ICD-10-CM    1. Benign paroxysmal positional vertigo of right ear  H81.11           Start Time: 1030  Stop Time: 1045  Total time in clinic (min): 15 minutes    Subjective: Patient reports a couple of days of different dizziness and then no symptoms since.      Objective: See treatment diary below      Assessment: (-) Judie-Hallpike on both sides, patient reports no symptoms or functional limitations at this time. Patient educated on prognosis for BPPV,  discharged and informed to call PT clinic if symptoms recur.      Plan:  Discharged and informed to call PT if symptoms recur.    Session completed by JUNIOR Link under supervision from Froylan Thomas DPT.      Precautions: N/A      Date 9/27 10/8           Visit # IE 2           FOTO IE done            Re-eval IE                 Manuals 9/27 10/8           Judie-Hallpike (+) R (-) R and L           Epley ND R            Pt edu  ND                        Neuro Re-Ed 9/27 10/8                                                                                                      Ther Ex 9/27 10/8                                                                                                                   Ther Activity                                       Gait Training                                         Modalities

## 2024-10-29 ENCOUNTER — OFFICE VISIT (OUTPATIENT)
Dept: INTERNAL MEDICINE CLINIC | Facility: CLINIC | Age: 79
End: 2024-10-29
Payer: COMMERCIAL

## 2024-10-29 VITALS
BODY MASS INDEX: 28.7 KG/M2 | HEART RATE: 65 BPM | SYSTOLIC BLOOD PRESSURE: 114 MMHG | HEIGHT: 61 IN | TEMPERATURE: 98 F | WEIGHT: 152 LBS | DIASTOLIC BLOOD PRESSURE: 80 MMHG

## 2024-10-29 DIAGNOSIS — J06.9 UPPER RESPIRATORY TRACT INFECTION, UNSPECIFIED TYPE: Primary | ICD-10-CM

## 2024-10-29 DIAGNOSIS — R05.9 COUGH, UNSPECIFIED TYPE: ICD-10-CM

## 2024-10-29 DIAGNOSIS — R09.81 SINUS CONGESTION: ICD-10-CM

## 2024-10-29 LAB
SARS-COV-2 AG UPPER RESP QL IA: NEGATIVE
SL AMB POCT RAPID FLU A: NEGATIVE
SL AMB POCT RAPID FLU B: NEGATIVE
VALID CONTROL: NORMAL

## 2024-10-29 PROCEDURE — G2211 COMPLEX E/M VISIT ADD ON: HCPCS | Performed by: STUDENT IN AN ORGANIZED HEALTH CARE EDUCATION/TRAINING PROGRAM

## 2024-10-29 PROCEDURE — 87804 INFLUENZA ASSAY W/OPTIC: CPT | Performed by: STUDENT IN AN ORGANIZED HEALTH CARE EDUCATION/TRAINING PROGRAM

## 2024-10-29 PROCEDURE — 99214 OFFICE O/P EST MOD 30 MIN: CPT | Performed by: STUDENT IN AN ORGANIZED HEALTH CARE EDUCATION/TRAINING PROGRAM

## 2024-10-29 PROCEDURE — 87811 SARS-COV-2 COVID19 W/OPTIC: CPT | Performed by: STUDENT IN AN ORGANIZED HEALTH CARE EDUCATION/TRAINING PROGRAM

## 2024-10-29 RX ORDER — DEXTROMETHORPHAN HYDROBROMIDE AND PROMETHAZINE HYDROCHLORIDE 15; 6.25 MG/5ML; MG/5ML
5 SYRUP ORAL 4 TIMES DAILY PRN
Qty: 240 ML | Refills: 1 | Status: SHIPPED | OUTPATIENT
Start: 2024-10-29

## 2024-10-29 RX ORDER — AZITHROMYCIN 250 MG/1
TABLET, FILM COATED ORAL
Qty: 6 TABLET | Refills: 0 | Status: SHIPPED | OUTPATIENT
Start: 2024-10-29 | End: 2024-11-02

## 2024-10-29 RX ORDER — ALBUTEROL SULFATE 90 UG/1
2 INHALANT RESPIRATORY (INHALATION) EVERY 6 HOURS PRN
Qty: 6.7 G | Refills: 1 | Status: SHIPPED | OUTPATIENT
Start: 2024-10-29

## 2024-10-29 NOTE — ASSESSMENT & PLAN NOTE
Reports symptoms started about 5 days ago with general malaise and nasal congestion.  Now experiencing sinus pressure, cough that is mostly dry however at times is productive, chest congestion.  She does get a little winded by the time she gets to the third floor of her house.  Denies wheezing.  Denies fever, chills, nausea, vomiting, diarrhea  She does not have a history of COPD or asthma however she does report a significant history of smoking.   There is some scattered wheezing throughout her lungs.  Will proceed with a Z-Eric and an albuterol inhaler to use as needed  Will send a prescription for Phenergan DM for cough  Advised OTC cold medications  Advised that if she has a suboptimal response to the albuterol inhaler or develops severe shortness of breath or wheezing, she may contact our office or seek care in the emergency room as needed        Orders:    azithromycin (Zithromax) 250 mg tablet; Take 2 tablets (500 mg total) by mouth daily for 1 day, THEN 1 tablet (250 mg total) daily for 4 days.    albuterol (Proventil HFA) 90 mcg/act inhaler; Inhale 2 puffs every 6 (six) hours as needed for wheezing    promethazine-dextromethorphan (PHENERGAN-DM) 6.25-15 mg/5 mL oral syrup; Take 5 mL by mouth 4 (four) times a day as needed for cough

## 2024-10-29 NOTE — PROGRESS NOTES
INTERNAL MEDICINE OFFICE VISIT NOTE  [facility] Internal Medicine [place]    NAME: Kathryn Ramsey  AGE: 79 y.o. SEX: female    DATE OF ENCOUNTER:       Chief Complaint   Patient presents with    Cough     Cough,sinus congestion, chest tightness           Assessment & Plan  Upper respiratory tract infection, unspecified type  Reports symptoms started about 5 days ago with general malaise and nasal congestion.  Now experiencing sinus pressure, cough that is mostly dry however at times is productive, chest congestion.  She does get a little winded by the time she gets to the third floor of her house.  Denies wheezing.  Denies fever, chills, nausea, vomiting, diarrhea  She does not have a history of COPD or asthma however she does report a significant history of smoking.   There is some scattered wheezing throughout her lungs.  Will proceed with a Z-Eric and an albuterol inhaler to use as needed  Will send a prescription for Phenergan DM for cough  Advised OTC cold medications  Advised that if she has a suboptimal response to the albuterol inhaler or develops severe shortness of breath or wheezing, she may contact our office or seek care in the emergency room as needed        Orders:    azithromycin (Zithromax) 250 mg tablet; Take 2 tablets (500 mg total) by mouth daily for 1 day, THEN 1 tablet (250 mg total) daily for 4 days.    albuterol (Proventil HFA) 90 mcg/act inhaler; Inhale 2 puffs every 6 (six) hours as needed for wheezing    promethazine-dextromethorphan (PHENERGAN-DM) 6.25-15 mg/5 mL oral syrup; Take 5 mL by mouth 4 (four) times a day as needed for cough    Cough, unspecified type    Orders:    POCT Rapid Covid Ag    POCT rapid flu A and B    Sinus congestion    Orders:    POCT Rapid Covid Ag    POCT rapid flu A and B      No follow-ups on file.      OBJECTIVE:  Vitals:    10/29/24 1448   BP: 114/80   BP Location: Left arm   Patient Position: Sitting   Cuff Size: Standard   Pulse: 65   Temp: 98 °F (36.7  "°C)   Weight: 68.9 kg (152 lb)   Height: 5' 1\" (1.549 m)         Physical Exam:   GENERAL: Indisposed appearance but NAD  NEUROLOGIC:  Alert/oriented x3.  HEENT:  NC/AT, no scleral icterus  CARDIAC:  RRR, +S1/S2  PULMONARY:  non-labored breathing  As above        Current Outpatient Medications:     albuterol (Proventil HFA) 90 mcg/act inhaler, Inhale 2 puffs every 6 (six) hours as needed for wheezing, Disp: 6.7 g, Rfl: 1    ascorbic acid (VITAMIN C) 500 mg tablet, Take 500 mg by mouth daily., Disp: , Rfl:     azithromycin (Zithromax) 250 mg tablet, Take 2 tablets (500 mg total) by mouth daily for 1 day, THEN 1 tablet (250 mg total) daily for 4 days., Disp: 6 tablet, Rfl: 0    Calcium Citrate-Vitamin D (CALCIUM CITRATE + PO), Take 1 tablet by mouth daily., Disp: , Rfl:     celecoxib (CeleBREX) 100 mg capsule, Take 1 capsule (100 mg total) by mouth 2 (two) times a day as needed for moderate pain, Disp: 30 capsule, Rfl: 0    Cholecalciferol (VITAMIN D-3 PO), Take 5,000 Units by mouth daily., Disp: , Rfl:     clobetasol (TEMOVATE) 0.05 % external solution, APPLY TWICE A DAY TO SCALP X 2 WEEKS THEN REDUCE TO THREE TIMES A WEEK., Disp: , Rfl:     co-enzyme Q-10 50 MG capsule, Take 200 mg by mouth daily, Disp: , Rfl:     cyanocobalamin (VITAMIN B-12) 500 mcg tablet, Take 1,000 mcg by mouth daily, Disp: , Rfl:     EPINEPHrine (EPIPEN) 0.3 mg/0.3 mL SOAJ, Inject 0.3 mL (0.3 mg total) into a muscle once for 1 dose, Disp: 0.6 mL, Rfl: 0    fexofenadine-pseudoephedrine (Allegra-D Allergy & Congestion) 180-240 MG per 24 hr tablet, Take by mouth, Disp: , Rfl:     Glucosamine-Chondroitin 500-400 MG CAPS, Take by mouth, Disp: , Rfl:     Iron, Ferrous Sulfate, 75 (15 Fe) MG/ML SOLN, Take by mouth, Disp: , Rfl:     Iron-Vitamin C 100-250 MG TABS, Take 1 tablet by mouth, Disp: , Rfl:     Multiple Vitamin (MULTIVITAMIN) tablet, Take 1 tablet by mouth daily., Disp: , Rfl:     oxybutynin (DITROPAN-XL) 5 mg 24 hr tablet, Take 1 tablet (5 " mg total) by mouth daily, Disp: 90 tablet, Rfl: 1    Potassium 99 MG TABS, Take by mouth, Disp: , Rfl:     promethazine-dextromethorphan (PHENERGAN-DM) 6.25-15 mg/5 mL oral syrup, Take 5 mL by mouth 4 (four) times a day as needed for cough, Disp: 240 mL, Rfl: 1    Turmeric 400 MG CAPS, Take by mouth, Disp: , Rfl:     vitamin E, tocopherol, 1,000 units capsule, Take 2,000 Units by mouth daily, Disp: , Rfl:     zinc gluconate 50 mg tablet, Take 50 mg by mouth daily., Disp: , Rfl:     Patient Active Problem List   Diagnosis    History of uterine cancer    Insomnia    Cigarette nicotine dependence without complication    Posterior vitreous detachment of right eye    Abnormal mammogram    [place]bl[place] retinal degeneration, right    Hemorrhoids    Elevated LDL cholesterol level    Vitamin D deficiency    Psoriasis    Seasonal allergies    Acute bilateral low back pain without sciatica    Urge incontinence    Swelling of finger joint of left hand    Caregiver stress    Elevated blood pressure reading in office without diagnosis of hypertension    Benign paroxysmal positional vertigo of right ear             Anil Flynn MD  [facility] Internal Medicine [place]    * Please Note: This note was completed in part utilizing a dictation software.  Grammatical errors, random word insertions, spelling mistakes, and incomplete sentences may be an occasional consequence of this system secondary to software limitations, ambient noise, and hardware issues.  If you have any questions or concerns about the content, text, or information contained within the body of this dictation, please contact the provider for clarification.**

## 2024-11-04 ENCOUNTER — TELEPHONE (OUTPATIENT)
Age: 79
End: 2024-11-04

## 2024-11-04 DIAGNOSIS — J06.9 ACUTE URI: Primary | ICD-10-CM

## 2024-11-04 RX ORDER — OXYMETAZOLINE HYDROCHLORIDE 0.05 G/100ML
2 SPRAY NASAL 2 TIMES DAILY PRN
Qty: 6 ML | Refills: 0 | Status: SHIPPED | OUTPATIENT
Start: 2024-11-04 | End: 2024-11-09

## 2024-11-04 RX ORDER — DOXYCYCLINE HYCLATE 100 MG
100 TABLET ORAL 2 TIMES DAILY
Qty: 10 TABLET | Refills: 0 | Status: SHIPPED | OUTPATIENT
Start: 2024-11-04 | End: 2024-11-09

## 2024-11-04 RX ORDER — BENZONATATE 200 MG/1
200 CAPSULE ORAL 3 TIMES DAILY PRN
Qty: 20 CAPSULE | Refills: 0 | Status: SHIPPED | OUTPATIENT
Start: 2024-11-04

## 2024-11-04 NOTE — TELEPHONE ENCOUNTER
Sent prescription for 5-day course of doxycycline to pharmacy.  Also sent for Tessalon Perles as needed for cough    Also sent for as needed Afrin nasal spray for nasal congestion.  Can use Afrin for up to 5 days

## 2024-11-04 NOTE — TELEPHONE ENCOUNTER
Patient called in and stated that she still isn't feeling well. Patient stated that she is still coughing really bad and the antibiotic didn't work. She is still blowing out thick green mucus.    Patient wanted to know if something else can be called in and is requesting a call back at .     Patient uses [facility] pharmacy on file.    Thank you

## 2024-12-16 ENCOUNTER — HOSPITAL ENCOUNTER (OUTPATIENT)
Dept: MAMMOGRAPHY | Facility: CLINIC | Age: 79
Discharge: HOME | End: 2024-12-16
Payer: COMMERCIAL

## 2024-12-16 VITALS — WEIGHT: 149 LBS | BODY MASS INDEX: 34.48 KG/M2 | HEIGHT: 55 IN

## 2024-12-16 DIAGNOSIS — Z12.31 SCREENING MAMMOGRAM, ENCOUNTER FOR: ICD-10-CM

## 2024-12-16 PROCEDURE — 77067 SCR MAMMO BI INCL CAD: CPT

## 2024-12-16 PROCEDURE — 77063 BREAST TOMOSYNTHESIS BI: CPT

## 2024-12-18 ENCOUNTER — RESULTS FOLLOW-UP (OUTPATIENT)
Dept: INTERNAL MEDICINE CLINIC | Facility: CLINIC | Age: 79
End: 2024-12-18

## 2025-03-01 ENCOUNTER — RA CDI HCC (OUTPATIENT)
Dept: OTHER | Facility: HOSPITAL | Age: 80
End: 2025-03-01

## 2025-03-01 NOTE — PROGRESS NOTES
HCC coding opportunities          Chart Reviewed number of suggestions sent to Provider: 1   Recommend adding N18.2 - chronic kidney disease stage 2.  Patient's eGFR values are in the stage 2 CKD range    Patients Insurance     Medicare Insurance: [employer] Advantage

## 2025-03-04 ENCOUNTER — APPOINTMENT (OUTPATIENT)
Dept: LAB | Facility: HOSPITAL | Age: 80
End: 2025-03-04
Payer: COMMERCIAL

## 2025-03-04 DIAGNOSIS — E78.00 ELEVATED LDL CHOLESTEROL LEVEL: ICD-10-CM

## 2025-03-04 DIAGNOSIS — E55.9 VITAMIN D DEFICIENCY: ICD-10-CM

## 2025-03-04 DIAGNOSIS — R79.9 ABNORMAL FINDING OF BLOOD CHEMISTRY, UNSPECIFIED: ICD-10-CM

## 2025-03-04 LAB
25(OH)D3 SERPL-MCNC: 52 NG/ML (ref 30–100)
ALBUMIN SERPL BCG-MCNC: 4 G/DL (ref 3.5–5)
ALP SERPL-CCNC: 40 U/L (ref 34–104)
ALT SERPL W P-5'-P-CCNC: 9 U/L (ref 7–52)
ANION GAP SERPL CALCULATED.3IONS-SCNC: 6 MMOL/L (ref 4–13)
AST SERPL W P-5'-P-CCNC: 15 U/L (ref 13–39)
BASOPHILS # BLD AUTO: 0.05 THOUSANDS/ÂΜL (ref 0–0.1)
BASOPHILS NFR BLD AUTO: 1 % (ref 0–1)
BILIRUB SERPL-MCNC: 0.44 MG/DL (ref 0.2–1)
BUN SERPL-MCNC: 18 MG/DL (ref 5–25)
CALCIUM SERPL-MCNC: 8.5 MG/DL (ref 8.4–10.2)
CHLORIDE SERPL-SCNC: 104 MMOL/L (ref 96–108)
CHOLEST SERPL-MCNC: 174 MG/DL (ref ?–200)
CO2 SERPL-SCNC: 30 MMOL/L (ref 21–32)
CREAT SERPL-MCNC: 0.79 MG/DL (ref 0.6–1.3)
EOSINOPHIL # BLD AUTO: 0.11 THOUSAND/ÂΜL (ref 0–0.61)
EOSINOPHIL NFR BLD AUTO: 2 % (ref 0–6)
ERYTHROCYTE [DISTWIDTH] IN BLOOD BY AUTOMATED COUNT: 13.7 % (ref 11.6–15.1)
EST. AVERAGE GLUCOSE BLD GHB EST-MCNC: 117 MG/DL
GFR SERPL CREATININE-BSD FRML MDRD: 71 ML/MIN/1.73SQ M
GLUCOSE P FAST SERPL-MCNC: 85 MG/DL (ref 65–99)
HBA1C MFR BLD: 5.7 %
HCT VFR BLD AUTO: 44.3 % (ref 34.8–46.1)
HDLC SERPL-MCNC: 63 MG/DL
HGB BLD-MCNC: 13.8 G/DL (ref 11.5–15.4)
IMM GRANULOCYTES # BLD AUTO: 0.02 THOUSAND/UL (ref 0–0.2)
IMM GRANULOCYTES NFR BLD AUTO: 0 % (ref 0–2)
LDLC SERPL CALC-MCNC: 88 MG/DL (ref 0–100)
LYMPHOCYTES # BLD AUTO: 1.95 THOUSANDS/ÂΜL (ref 0.6–4.47)
LYMPHOCYTES NFR BLD AUTO: 33 % (ref 14–44)
MCH RBC QN AUTO: 29.7 PG (ref 26.8–34.3)
MCHC RBC AUTO-ENTMCNC: 31.2 G/DL (ref 31.4–37.4)
MCV RBC AUTO: 95 FL (ref 82–98)
MONOCYTES # BLD AUTO: 0.47 THOUSAND/ÂΜL (ref 0.17–1.22)
MONOCYTES NFR BLD AUTO: 8 % (ref 4–12)
NEUTROPHILS # BLD AUTO: 3.4 THOUSANDS/ÂΜL (ref 1.85–7.62)
NEUTS SEG NFR BLD AUTO: 56 % (ref 43–75)
NRBC BLD AUTO-RTO: 0 /100 WBCS
PLATELET # BLD AUTO: 259 THOUSANDS/UL (ref 149–390)
PMV BLD AUTO: 10.1 FL (ref 8.9–12.7)
POTASSIUM SERPL-SCNC: 4.3 MMOL/L (ref 3.5–5.3)
PROT SERPL-MCNC: 6 G/DL (ref 6.4–8.4)
RBC # BLD AUTO: 4.65 MILLION/UL (ref 3.81–5.12)
SODIUM SERPL-SCNC: 140 MMOL/L (ref 135–147)
TRIGL SERPL-MCNC: 114 MG/DL (ref ?–150)
WBC # BLD AUTO: 6 THOUSAND/UL (ref 4.31–10.16)

## 2025-03-04 PROCEDURE — 83036 HEMOGLOBIN GLYCOSYLATED A1C: CPT

## 2025-03-04 PROCEDURE — 85025 COMPLETE CBC W/AUTO DIFF WBC: CPT

## 2025-03-04 PROCEDURE — 80053 COMPREHEN METABOLIC PANEL: CPT

## 2025-03-04 PROCEDURE — 80061 LIPID PANEL: CPT

## 2025-03-04 PROCEDURE — 36415 COLL VENOUS BLD VENIPUNCTURE: CPT

## 2025-03-04 PROCEDURE — 82306 VITAMIN D 25 HYDROXY: CPT

## 2025-03-05 ENCOUNTER — RESULTS FOLLOW-UP (OUTPATIENT)
Dept: INTERNAL MEDICINE CLINIC | Facility: CLINIC | Age: 80
End: 2025-03-05

## 2025-03-09 DIAGNOSIS — N39.41 URGE INCONTINENCE: ICD-10-CM

## 2025-03-10 RX ORDER — OXYBUTYNIN CHLORIDE 5 MG/1
5 TABLET, EXTENDED RELEASE ORAL DAILY
Qty: 90 TABLET | Refills: 1 | Status: SHIPPED | OUTPATIENT
Start: 2025-03-10

## 2025-03-11 ENCOUNTER — RA CDI HCC (OUTPATIENT)
Dept: OTHER | Facility: HOSPITAL | Age: 80
End: 2025-03-11

## 2025-03-13 ENCOUNTER — OFFICE VISIT (OUTPATIENT)
Dept: INTERNAL MEDICINE CLINIC | Facility: CLINIC | Age: 80
End: 2025-03-13
Payer: COMMERCIAL

## 2025-03-13 VITALS
TEMPERATURE: 96.9 F | HEART RATE: 59 BPM | OXYGEN SATURATION: 99 % | RESPIRATION RATE: 18 BRPM | HEIGHT: 55 IN | DIASTOLIC BLOOD PRESSURE: 74 MMHG | BODY MASS INDEX: 34.71 KG/M2 | WEIGHT: 150 LBS | SYSTOLIC BLOOD PRESSURE: 138 MMHG

## 2025-03-13 DIAGNOSIS — M54.50 CHRONIC BILATERAL LOW BACK PAIN WITHOUT SCIATICA: ICD-10-CM

## 2025-03-13 DIAGNOSIS — N39.41 URGE INCONTINENCE: ICD-10-CM

## 2025-03-13 DIAGNOSIS — G89.29 CHRONIC BILATERAL LOW BACK PAIN WITHOUT SCIATICA: ICD-10-CM

## 2025-03-13 DIAGNOSIS — F17.210 CIGARETTE NICOTINE DEPENDENCE WITHOUT COMPLICATION: ICD-10-CM

## 2025-03-13 DIAGNOSIS — M54.50 ACUTE BILATERAL LOW BACK PAIN WITHOUT SCIATICA: ICD-10-CM

## 2025-03-13 DIAGNOSIS — Z00.00 MEDICARE ANNUAL WELLNESS VISIT, SUBSEQUENT: Primary | ICD-10-CM

## 2025-03-13 DIAGNOSIS — R05.3 CHRONIC COUGH: ICD-10-CM

## 2025-03-13 PROCEDURE — G2211 COMPLEX E/M VISIT ADD ON: HCPCS | Performed by: INTERNAL MEDICINE

## 2025-03-13 PROCEDURE — 99214 OFFICE O/P EST MOD 30 MIN: CPT | Performed by: INTERNAL MEDICINE

## 2025-03-13 PROCEDURE — G0439 PPPS, SUBSEQ VISIT: HCPCS | Performed by: INTERNAL MEDICINE

## 2025-03-13 RX ORDER — CELECOXIB 100 MG/1
100 CAPSULE ORAL 2 TIMES DAILY PRN
Qty: 30 CAPSULE | Refills: 0 | Status: SHIPPED | OUTPATIENT
Start: 2025-03-13

## 2025-03-13 NOTE — PROGRESS NOTES
"Name: Kathryn Ramsey      : 1945      MRN: [id number]  Encounter Provider: Kraig Gunn DO  Encounter Date: 3/13/2025   Encounter department: [facility] INTERNAL MEDICINE [place]    Medical history of vitreous detachment of the right eye, hemorrhoids status post hemorrhoidectomy, osteopenia, possible uterine cancer status post hysterectomy/oophorectomy, nicotine dependence     Assessment & Plan  Medicare annual wellness visit, subsequent         Chronic cough  Chronic cough.  Ongoing since October.  Patient states cough tends to be worse in the morning and when she is laying down at night.  She has cut back on cigarette usage to approximately 1 pack a month.  Has tried Allegra-D, nebulizers, nasal saline.  She is \"not a fan\" of nasal sprays.  Has also tried over-the-counter Tussin    Lungs clear to auscultation today  We will check chest x-ray  Symptoms possibly related to PND/UACS or GERD.  Suggested she could try intranasal corticosteroid and also suggested she try famotidine at bedtime to address possible GERD component  -If symptoms persist, consider chest CT imaging given her smoking history      Orders:  •  XR chest pa and lateral; Future    Cigarette nicotine dependence without complication  Ongoing cessation encouraged  Patient currently smoking approximately 1 pack of cigarettes a month           Chronic bilateral low back pain without sciatica  Stable  Continue Celebrex as needed       Urge incontinence  Mixed urge and stress incontinence  Symptoms appear to be stable  Continue oxybutynin 5 mg daily. Appears to be tolerating without issue.       Acute bilateral low back pain without sciatica    Orders:  •  celecoxib (CeleBREX) 100 mg capsule; Take 1 capsule (100 mg total) by mouth 2 (two) times a day as needed for moderate pain       Preventive health issues were discussed with patient, and age appropriate screening tests were ordered as noted in patient's After Visit Summary. " Personalized health advice and appropriate referrals for health education or preventive services given if needed, as noted in patient's After Visit Summary.    History of Present Illness     HPI   Patient Care Team:  Kraig Gunn DO as PCP - General (Internal Medicine)    Review of Systems  Medical History Reviewed by provider this encounter:  Meds  Problems       Annual Wellness Visit Questionnaire   Kathryn is here for her Subsequent Wellness visit.     Health Risk Assessment:   Patient rates overall health as very good. Patient feels that their physical health rating is same. Patient is very satisfied with their life. Eyesight was rated as same. Hearing was rated as same. Patient feels that their emotional and mental health rating is same. Patients states they are sometimes angry. Patient states they are sometimes unusually tired/fatigued. Pain experienced in the last 7 days has been some. Patient's pain rating has been 5/10. Patient states that she has experienced no weight loss or gain in last 6 months.     Depression Screening:   PHQ-2 Score: 0      Fall Risk Screening:   In the past year, patient has experienced: no history of falling in past year      Urinary Incontinence Screening:   Patient has leaked urine accidently in the last six months.     Home Safety:  Patient does not have trouble with stairs inside or outside of their home. Patient has working smoke alarms and has working carbon monoxide detector. Home safety hazards include: none.     Nutrition:   Current diet is Regular.     Medications:   Patient is currently taking over-the-counter supplements. OTC medications include: see medication list. Patient is able to manage medications.     Activities of Daily Living (ADLs)/Instrumental Activities of Daily Living (IADLs):   Walk and transfer into and out of bed and chair?: Yes  Dress and groom yourself?: Yes    Bathe or shower yourself?: Yes    Feed yourself? Yes  Do your  laundry/housekeeping?: Yes  Manage your money, pay your bills and track your expenses?: Yes  Make your own meals?: Yes    Do your own shopping?: Yes    Previous Hospitalizations:   Any hospitalizations or ED visits within the last 12 months?: No      Advance Care Planning:   Living will: Yes    Durable POA for healthcare: Yes    Advanced directive: Yes      Cognitive Screening:   Provider or family/friend/caregiver concerned regarding cognition?: No    PREVENTIVE SCREENINGS      Cardiovascular Screening:    General: Screening Current      Diabetes Screening:     General: Screening Current      Colorectal Cancer Screening:     General: Screening Not Indicated      Breast Cancer Screening:     General: Screening Current      Cervical Cancer Screening:    General: Screening Not Indicated      Osteoporosis Screening:    General: Risks and Benefits Discussed    Due for: DXA Axial      Abdominal Aortic Aneurysm (AAA) Screening:        General: Screening Not Indicated      Lung Cancer Screening:     General: Screening Not Indicated    Screening, Brief Intervention, and Referral to Treatment (SBIRT)     Screening  Typical number of drinks in a day: 0  Typical number of drinks in a week: 0  Interpretation: Low risk drinking behavior.    Single Item Drug Screening:  How often have you used an illegal drug (including marijuana) or a prescription medication for non-medical reasons in the past year? never    Single Item Drug Screen Score: 0  Interpretation: Negative screen for possible drug use disorder    Brief Intervention  Alcohol & drug use screenings were reviewed. No concerns regarding substance use disorder identified.     Social Drivers of Health     Financial Resource Strain: Low Risk  (9/14/2023)    Overall Financial Resource Strain (CARDIA)    • Difficulty of Paying Living Expenses: Not hard at all   Food Insecurity: No Food Insecurity (3/13/2025)    Hunger Vital Sign    • Worried About Running Out of Food in the Last  Year: Never true    • Ran Out of Food in the Last Year: Never true   Transportation Needs: No Transportation Needs (3/13/2025)    PRAPARE - Transportation    • Lack of Transportation (Medical): No    • Lack of Transportation (Non-Medical): No   Housing Stability: Unknown (3/13/2025)    Housing Stability Vital Sign    • Unable to Pay for Housing in the Last Year: No    • Homeless in the Last Year: No   Utilities: Not At Risk (3/13/2025)    [facility] Utilities    • Threatened with loss of utilities: No     No results found.    Objective   /74   Pulse 59   Temp (!) 96.9 °F (36.1 °C) (Temporal)   Resp 18   Ht 4' (1.219 m)   Wt 68 kg (150 lb)   SpO2 99%   BMI 45.77 kg/m²     Physical Exam  Cardiovascular:      Rate and Rhythm: Normal rate and regular rhythm.      Heart sounds: Normal heart sounds. No murmur heard.  Pulmonary:      Effort: Pulmonary effort is normal.      Breath sounds: Normal breath sounds. No wheezing, rhonchi or rales.

## 2025-03-13 NOTE — ASSESSMENT & PLAN NOTE
Mixed urge and stress incontinence  Symptoms appear to be stable  Continue oxybutynin 5 mg daily. Appears to be tolerating without issue.

## 2025-03-13 NOTE — ASSESSMENT & PLAN NOTE
Ongoing cessation encouraged  Patient currently smoking approximately 1 pack of cigarettes a month

## 2025-04-02 ENCOUNTER — HOSPITAL ENCOUNTER (OUTPATIENT)
Dept: RADIOLOGY | Facility: HOSPITAL | Age: 80
Discharge: HOME | End: 2025-04-02
Attending: ORTHOPAEDIC SURGERY
Payer: COMMERCIAL

## 2025-04-02 ENCOUNTER — OFFICE VISIT (OUTPATIENT)
Dept: OBGYN CLINIC | Facility: HOSPITAL | Age: 80
End: 2025-04-02
Payer: COMMERCIAL

## 2025-04-02 VITALS — BODY MASS INDEX: 34.69 KG/M2 | WEIGHT: 149.91 LBS | HEIGHT: 55 IN

## 2025-04-02 DIAGNOSIS — M54.16 RADICULOPATHY, LUMBAR REGION: Primary | ICD-10-CM

## 2025-04-02 DIAGNOSIS — M47.816 LUMBAR SPONDYLOSIS: ICD-10-CM

## 2025-04-02 DIAGNOSIS — R52 PAIN: ICD-10-CM

## 2025-04-02 PROCEDURE — 99204 OFFICE O/P NEW MOD 45 MIN: CPT | Performed by: ORTHOPAEDIC SURGERY

## 2025-04-02 PROCEDURE — 72110 X-RAY EXAM L-2 SPINE 4/>VWS: CPT

## 2025-04-02 NOTE — PROGRESS NOTES
Name: Kathryn Ramsey      : 1945       MRN: [id number]   Encounter Provider: Juan Manuel Moore MD   Encounter Date: 25  Encounter department: [facility] ORTHOPEDIC CARE SPECIALISTS [facility] ORTHOPEDIC SPINE SURGERY    Medical Decision Making:     Assessment & Plan  Radiculopathy, lumbar region    The clinical, physical and imaging findings were reviewed with the patient  Discussed the etiology and pathomechanics of lumbar disc degeneration and spondylosis  Discussed operative and non operative treatment options   Non operative treatment options include physical therapy, at home exercises, activity modifications, chiropractic medicine, acupuncture, oral medications, and interventional spine procedures  After discussion with the patient we agreed upon continued conservative treatments  Recommend physical therapy/HEP to work on core strengthening, lumbar ROM, strengthening, and stretching exercises.  Referral provided.   Referral to physiatry/[facility] for evaluation and treatment. Discussed potential role of steroid injection at or near the source of pain to provide targeted relief.   MRI lumbar spine to further evaluate patient's symptoms. Will review MRI in detail with patient at follow-up visit and discuss further treatment options at that time.   Ice/heat, OTC pain medication as needed.  Follow-up:  Return in about 3 months for follow up after completion of MRI and further conservative treatments.      Orders:    XR spine lumbar minimum 4 views non injury; Future    Ambulatory Referral to Physiatry; Future    Ambulatory Referral to Physical Therapy; Future    MRI lumbar spine wo contrast; Future    Lumbar spondylosis    Orders:    Ambulatory Referral to Physiatry; Future    Ambulatory Referral to Physical Therapy; Future    MRI lumbar spine wo contrast; Future     Diagnostic Tests   IMAGING: I have personally reviewed the images and these are my findings:  Lumbar Spine X-rays  "from 4/2/2025: multi level lumbar spondylosis with loss of disc height, facet hypertrophy, no appreciated lytic/blastic lesions, there is L1-2 and L4-5 retrolisthesis.         Subjective:      Chief Complaint: Back Pain    HPI:  Kathryn Ramsey is a 79 y.o. female presenting for initial visit with chief complaint of lower back pain located at the belt line. Patient was referred by Kraig Gunn DO. Pain began years ago, but has worsened substantially over the last month.  Describes pain as a stabbing pain in nature, 10/10 today.  Pain is worse with siting, standing, laying down and improves somewhat with Celebrex.  Denies any natasha trauma. Denies fever or chills, no night sweats. Denies any bladder or bowel changes.  She feels limited by her symptoms. Previously followed by Dr. Upton who previously gave her facet injections with good relief of her symptoms up to 1 year at a time. She appreciated that he let her bring her maggi bear with her into the OR for comfort when she received injections. She states he's since passed away unfortunately and she's looking for a new provider. She's tried injections with [facility] but felt that they \"over did it\" and she did not get adequate relief of her symptoms. She has tried physical therapy and activity modifications with some relief. Reports substantial difficulty with stairs and cleaning the house. She states she did physical therapy years ago.    Conservative therapy includes the following:   Medications: Celebrex    Injections: Yes with good improvement of her symptoms.  Physical Therapy: none recent  Chiropractic Medicine: has not attempted  Accupunture/Massage Therapy: has not attempted     Nicotine dependent: Yes  Occupation: retired  Living situation: Lives with family   ADLs: patient is unable to perform ADL's such as shopping, cleaning, driving, etc     Objective:     Family History   Problem Relation Age of Onset    No Known Problems " Mother     No Known Problems Father     No Known Problems Sister     No Known Problems Sister     Colon cancer Maternal Grandmother         age dx unknown    Colon cancer Maternal Grandfather     No Known Problems Paternal Grandmother     No Known Problems Paternal Grandfather     No Known Problems Brother     Breast cancer Maternal Aunt 85       Past Medical History:   Diagnosis Date    History of uterine cancer     Insomnia     Lumbar disc disease        Current Outpatient Medications   Medication Sig Dispense Refill    albuterol (Proventil HFA) 90 mcg/act inhaler Inhale 2 puffs every 6 (six) hours as needed for wheezing 6.7 g 1    ascorbic acid (VITAMIN C) 500 mg tablet Take 500 mg by mouth daily.      Calcium Citrate-Vitamin D (CALCIUM CITRATE + PO) Take 1 tablet by mouth daily.      celecoxib (CeleBREX) 100 mg capsule Take 1 capsule (100 mg total) by mouth 2 (two) times a day as needed for moderate pain 30 capsule 0    Cholecalciferol (VITAMIN D-3 PO) Take 5,000 Units by mouth daily.      clobetasol (TEMOVATE) 0.05 % external solution APPLY TWICE A DAY TO SCALP X 2 WEEKS THEN REDUCE TO THREE TIMES A WEEK.      co-enzyme Q-10 50 MG capsule Take 200 mg by mouth daily      cyanocobalamin (VITAMIN B-12) 500 mcg tablet Take 1,000 mcg by mouth daily      EPINEPHrine (EPIPEN) 0.3 mg/0.3 mL SOAJ Inject 0.3 mL (0.3 mg total) into a muscle once for 1 dose 0.6 mL 0    fexofenadine-pseudoephedrine (Allegra-D Allergy & Congestion) 180-240 MG per 24 hr tablet Take by mouth (Patient not taking: Reported on 3/13/2025)      Glucosamine-Chondroitin 500-400 MG CAPS Take by mouth      Iron, Ferrous Sulfate, 75 (15 Fe) MG/ML SOLN Take by mouth      Iron-Vitamin C 100-250 MG TABS Take 1 tablet by mouth      Multiple Vitamin (MULTIVITAMIN) tablet Take 1 tablet by mouth daily.      oxybutynin (DITROPAN-XL) 5 mg 24 hr tablet TAKE 1 TABLET (5 MG TOTAL) BY MOUTH DAILY. 90 tablet 1    Potassium 99 MG TABS Take by mouth      Turmeric 400  MG CAPS Take by mouth      vitamin E, tocopherol, 1,000 units capsule Take 2,000 Units by mouth daily      zinc gluconate 50 mg tablet Take 50 mg by mouth daily.       No current facility-administered medications for this visit.       Past Surgical History:   Procedure Laterality Date    APPENDECTOMY      APPENDECTOMY      BREAST CYST EXCISION Right 10/24/2007    benign    BREAST CYST EXCISION Right 03/09/2016    benign    BREAST LUMPECTOMY      BREAST LUMPECTOMY Right 3/9/2016    Procedure:  RIGHT BREAST BIOPSY ,LUMPECTOMY BREAST NEEDLE LOCALIZED @ 1230 ;  Surgeon: Gem Masters MD;  Location: [facility];  Service:     BREAST SURGERY      lumpectomy    CATARACT EXTRACTION      COLONOSCOPY      HYSTERECTOMY      Age 23    MAMMO NEEDLE LOCALIZATION RIGHT (ALL INC) Right 3/9/2016    OOPHORECTOMY      MT HEMORRHOIDECTOMY INT & XTRNL 2/> COLUMN/KAELYN N/A 7/14/2021    Procedure: INTERNAL/EXTERNAL HEMORRHOIDECTOMY EXCISION;  Surgeon: Riley Washington DO;  Location:  MAIN OR;  Service: General    TONSILLECTOMY         Social History     Socioeconomic History    Marital status: /Civil Union     Spouse name: Not on file    Number of children: Not on file    Years of education: Not on file    Highest education level: Not on file   Occupational History    Not on file   Tobacco Use    Smoking status: Every Day     Current packs/day: 0.50     Average packs/day: 0.5 packs/day for 60.3 years (30.1 ttl pk-yrs)     Types: Cigarettes     Start date: 12/29/1964     Passive exposure: Never    Smokeless tobacco: Never    Tobacco comments:     smoke one pack every two weeks   Vaping Use    Vaping status: Never Used   Substance and Sexual Activity    Alcohol use: Yes     Comment: Special occ    Drug use: Yes    Sexual activity: Not Currently   Other Topics Concern    Not on file   Social History Narrative    Not on file     Social Drivers of Health     Financial Resource Strain: Low Risk  (9/14/2023)    Overall Financial Resource  Strain (CARDIA)     Difficulty of Paying Living Expenses: Not hard at all   Food Insecurity: No Food Insecurity (3/13/2025)    Hunger Vital Sign     Worried About Running Out of Food in the Last Year: Never true     Ran Out of Food in the Last Year: Never true   Transportation Needs: No Transportation Needs (3/13/2025)    PRAPARE - Transportation     Lack of Transportation (Medical): No     Lack of Transportation (Non-Medical): No   Physical Activity: Not on file   Stress: Not on file   Social Connections: Not on file   Intimate Partner Violence: Not on file   Housing Stability: Unknown (3/13/2025)    Housing Stability Vital Sign     Unable to Pay for Housing in the Last Year: No     Number of Times Moved in the Last Year: Not on file     Homeless in the Last Year: No       Allergies   Allergen Reactions    Codeine Hives    Morphine And Codeine Other (See Comments)     Hallucinations and vomiting. Pt reports being sensative to most    Other Anaphylaxis, GI Intolerance, Rash and Shortness Of Breath     All Seafood, Banana, mushrooms, tomatoes, watermelon    With touching tomatoes    Penicillins Anaphylaxis    Ciprofloxacin Hives     Rash, vomiting    Banana - Food Allergy Hives    Mushroom Abdominal Pain    Mushroom Extract Complex - Food Allergy Hives    Tomato - Food Allergy Hives       Review of Systems  General- denies fever/chills  HEENT- denies hearing loss or sore throat  Eyes- denies eye pain or visual disturbances, denies red eyes  Respiratory- denies cough or SOB  Cardio- denies chest pain or palpitations  GI- denies abdominal pain  Endocrine- denies urinary frequency  Urinary- denies pain with urination  Musculoskeletal- Negative except noted above  Skin- denies rashes or wounds  Neurological- denies dizziness or headache  Psychiatric- denies anxiety or difficulty concentrating    Physical Exam  There were no vitals taken for this visit.    General/Constitutional: No apparent distress: well-nourished and  "well developed.  Lymphatic: No appreciable lymphadenopathy  Respiratory: Non-labored breathing  Vascular: No edema, swelling or tenderness, except as noted in detailed exam.  Integumentary: No impressive skin lesions present, except as noted in detailed exam.  Psych: Normal mood and affect, oriented to person, place and time.  MSK: normal other than stated in HPI and exam  Gait & balance: no evidence of myelopathic gait, ambulates Independently     Lumbar spine range of motion:  -Forward flexion to 90  -Extension to neutral  There is mild tenderness with palpation along lumbar paraspinal musculature, no midline tenderness     Neurologic:    Lower Extremity Motor Function    Right  Left    Iliopsoas  5/5  5/5    Quadriceps 5/5 5/5   Tibialis anterior  5/5  5/5    EHL  5/5  5/5    Gastroc. muscle  5/5  5/5      Sensory: light touch is intact to bilateral lower extremities     Reflexes:  Intact    Other tests:  Straight Leg Raise: negative  Anitha SI: negative  LIA SI: negative  Greater troch: no tenderness   Internal/external hip ROM: intact, no pain   Flexion/extension knee ROM: intact, no pain   Vascular: WWP extremities    Electronic Medical Records were reviewed.    Procedures, if performed today     None performed       Portions of the record may have been created with voice recognition software.  Occasional wrong word or \"sound a like\" substitutions may have occurred due to the inherent limitations of voice recognition software.  Read the chart carefully and recognize, using context, where substitutions have occurred.    "

## 2025-04-02 NOTE — LETTER
April 3, 2025     Kraig Gunn DO  [address]  [address]  [place] [address]    Patient: Kathryn Ramsey   YOB: 1945   Date of Visit: 2025       Dear Dr. Kraig Gunn DO:    Thank you for referring Kathryn Ramsey to me for evaluation. Below are my notes for this consultation.    If you have questions, please do not hesitate to call me. I look forward to following your patient along with you.         Sincerely,        Juan Manuel Moore MD        CC: No Recipients    Juan Manuel Moore MD  4/3/2025 11:57 AM  Sign when Signing Visit  Name: Kathryn Ramsey      : 1945       MRN: [id number]   Encounter Provider: Juan Manuel Moore MD   Encounter Date: 25  Encounter department: [facility] ORTHOPEDIC CARE SPECIALISTS [facility] ORTHOPEDIC SPINE SURGERY    Medical Decision Making:     Assessment & Plan  Radiculopathy, lumbar region    The clinical, physical and imaging findings were reviewed with the patient  Discussed the etiology and pathomechanics of lumbar disc degeneration and spondylosis  Discussed operative and non operative treatment options   Non operative treatment options include physical therapy, at home exercises, activity modifications, chiropractic medicine, acupuncture, oral medications, and interventional spine procedures  After discussion with the patient we agreed upon continued conservative treatments  Recommend physical therapy/HEP to work on core strengthening, lumbar ROM, strengthening, and stretching exercises.  Referral provided.   Referral to physiatry/[facility] for evaluation and treatment. Discussed potential role of steroid injection at or near the source of pain to provide targeted relief.   MRI lumbar spine to further evaluate patient's symptoms. Will review MRI in detail with patient at follow-up visit and discuss further treatment options at that time.   Ice/heat, OTC pain medication as needed.  Follow-up:   "Return in about 3 months for follow up after completion of MRI and further conservative treatments.      Orders:  •  XR spine lumbar minimum 4 views non injury; Future  •  Ambulatory Referral to Physiatry; Future  •  Ambulatory Referral to Physical Therapy; Future  •  MRI lumbar spine wo contrast; Future    Lumbar spondylosis    Orders:  •  Ambulatory Referral to Physiatry; Future  •  Ambulatory Referral to Physical Therapy; Future  •  MRI lumbar spine wo contrast; Future     Diagnostic Tests   IMAGING: I have personally reviewed the images and these are my findings:  Lumbar Spine X-rays from 4/2/2025: multi level lumbar spondylosis with loss of disc height, facet hypertrophy, no appreciated lytic/blastic lesions, there is L1-2 and L4-5 retrolisthesis.         Subjective:      Chief Complaint: Back Pain    HPI:  Kathryn Ramsey is a 79 y.o. female presenting for initial visit with chief complaint of lower back pain located at the belt line. Patient was referred by Kraig Gunn DO. Pain began years ago, but has worsened substantially over the last month.  Describes pain as a stabbing pain in nature, 10/10 today.  Pain is worse with siting, standing, laying down and improves somewhat with Celebrex.  Denies any natasha trauma. Denies fever or chills, no night sweats. Denies any bladder or bowel changes.  She feels limited by her symptoms. Previously followed by Dr. Upton who previously gave her facet injections with good relief of her symptoms up to 1 year at a time. She appreciated that he let her bring her maggi bear with her into the OR for comfort when she received injections. She states he's since passed away unfortunately and she's looking for a new provider. She's tried injections with [facility] but felt that they \"over did it\" and she did not get adequate relief of her symptoms. She has tried physical therapy and activity modifications with some relief. Reports substantial difficulty " with stairs and cleaning the house. She states she did physical therapy years ago.    Conservative therapy includes the following:   Medications: Celebrex    Injections: Yes with good improvement of her symptoms.  Physical Therapy: none recent  Chiropractic Medicine: has not attempted  Accupunture/Massage Therapy: has not attempted     Nicotine dependent: Yes  Occupation: retired  Living situation: Lives with family   ADLs: patient is unable to perform ADL's such as shopping, cleaning, driving, etc     Objective:     Family History   Problem Relation Age of Onset   • No Known Problems Mother    • No Known Problems Father    • No Known Problems Sister    • No Known Problems Sister    • Colon cancer Maternal Grandmother         age dx unknown   • Colon cancer Maternal Grandfather    • No Known Problems Paternal Grandmother    • No Known Problems Paternal Grandfather    • No Known Problems Brother    • Breast cancer Maternal Aunt 85       Past Medical History:   Diagnosis Date   • History of uterine cancer    • Insomnia    • Lumbar disc disease        Current Outpatient Medications   Medication Sig Dispense Refill   • albuterol (Proventil HFA) 90 mcg/act inhaler Inhale 2 puffs every 6 (six) hours as needed for wheezing 6.7 g 1   • ascorbic acid (VITAMIN C) 500 mg tablet Take 500 mg by mouth daily.     • Calcium Citrate-Vitamin D (CALCIUM CITRATE + PO) Take 1 tablet by mouth daily.     • celecoxib (CeleBREX) 100 mg capsule Take 1 capsule (100 mg total) by mouth 2 (two) times a day as needed for moderate pain 30 capsule 0   • Cholecalciferol (VITAMIN D-3 PO) Take 5,000 Units by mouth daily.     • clobetasol (TEMOVATE) 0.05 % external solution APPLY TWICE A DAY TO SCALP X 2 WEEKS THEN REDUCE TO THREE TIMES A WEEK.     • co-enzyme Q-10 50 MG capsule Take 200 mg by mouth daily     • cyanocobalamin (VITAMIN B-12) 500 mcg tablet Take 1,000 mcg by mouth daily     • EPINEPHrine (EPIPEN) 0.3 mg/0.3 mL SOAJ Inject 0.3 mL (0.3 mg  total) into a muscle once for 1 dose 0.6 mL 0   • fexofenadine-pseudoephedrine (Allegra-D Allergy & Congestion) 180-240 MG per 24 hr tablet Take by mouth (Patient not taking: Reported on 3/13/2025)     • Glucosamine-Chondroitin 500-400 MG CAPS Take by mouth     • Iron, Ferrous Sulfate, 75 (15 Fe) MG/ML SOLN Take by mouth     • Iron-Vitamin C 100-250 MG TABS Take 1 tablet by mouth     • Multiple Vitamin (MULTIVITAMIN) tablet Take 1 tablet by mouth daily.     • oxybutynin (DITROPAN-XL) 5 mg 24 hr tablet TAKE 1 TABLET (5 MG TOTAL) BY MOUTH DAILY. 90 tablet 1   • Potassium 99 MG TABS Take by mouth     • Turmeric 400 MG CAPS Take by mouth     • vitamin E, tocopherol, 1,000 units capsule Take 2,000 Units by mouth daily     • zinc gluconate 50 mg tablet Take 50 mg by mouth daily.       No current facility-administered medications for this visit.       Past Surgical History:   Procedure Laterality Date   • APPENDECTOMY     • APPENDECTOMY     • BREAST CYST EXCISION Right 10/24/2007    benign   • BREAST CYST EXCISION Right 03/09/2016    benign   • BREAST LUMPECTOMY     • BREAST LUMPECTOMY Right 3/9/2016    Procedure:  RIGHT BREAST BIOPSY ,LUMPECTOMY BREAST NEEDLE LOCALIZED @ 1230 ;  Surgeon: Gem Masters MD;  Location: [facility];  Service:    • BREAST SURGERY      lumpectomy   • CATARACT EXTRACTION     • COLONOSCOPY     • HYSTERECTOMY      Age 23   • MAMMO NEEDLE LOCALIZATION RIGHT (ALL INC) Right 3/9/2016   • OOPHORECTOMY     • MS HEMORRHOIDECTOMY INT & XTRNL 2/> COLUMN/KAELYN N/A 7/14/2021    Procedure: INTERNAL/EXTERNAL HEMORRHOIDECTOMY EXCISION;  Surgeon: Riley Washington DO;  Location:  MAIN OR;  Service: General   • TONSILLECTOMY         Social History     Socioeconomic History   • Marital status: /Civil Union     Spouse name: Not on file   • Number of children: Not on file   • Years of education: Not on file   • Highest education level: Not on file   Occupational History   • Not on file   Tobacco Use   • Smoking  status: Every Day     Current packs/day: 0.50     Average packs/day: 0.5 packs/day for 60.3 years (30.1 ttl pk-yrs)     Types: Cigarettes     Start date: 12/29/1964     Passive exposure: Never   • Smokeless tobacco: Never   • Tobacco comments:     smoke one pack every two weeks   Vaping Use   • Vaping status: Never Used   Substance and Sexual Activity   • Alcohol use: Yes     Comment: Special occ   • Drug use: Yes   • Sexual activity: Not Currently   Other Topics Concern   • Not on file   Social History Narrative   • Not on file     Social Drivers of Health     Financial Resource Strain: Low Risk  (9/14/2023)    Overall Financial Resource Strain (CARDIA)    • Difficulty of Paying Living Expenses: Not hard at all   Food Insecurity: No Food Insecurity (3/13/2025)    Hunger Vital Sign    • Worried About Running Out of Food in the Last Year: Never true    • Ran Out of Food in the Last Year: Never true   Transportation Needs: No Transportation Needs (3/13/2025)    PRAPARE - Transportation    • Lack of Transportation (Medical): No    • Lack of Transportation (Non-Medical): No   Physical Activity: Not on file   Stress: Not on file   Social Connections: Not on file   Intimate Partner Violence: Not on file   Housing Stability: Unknown (3/13/2025)    Housing Stability Vital Sign    • Unable to Pay for Housing in the Last Year: No    • Number of Times Moved in the Last Year: Not on file    • Homeless in the Last Year: No       Allergies   Allergen Reactions   • Codeine Hives   • Morphine And Codeine Other (See Comments)     Hallucinations and vomiting. Pt reports being sensative to most   • Other Anaphylaxis, GI Intolerance, Rash and Shortness Of Breath     All Seafood, Banana, mushrooms, tomatoes, watermelon    With touching tomatoes   • Penicillins Anaphylaxis   • Ciprofloxacin Hives     Rash, vomiting   • Banana - Food Allergy Hives   • Mushroom Abdominal Pain   • Mushroom Extract Complex - Food Allergy Hives   • Tomato -  Food Allergy Hives       Review of Systems  General- denies fever/chills  HEENT- denies hearing loss or sore throat  Eyes- denies eye pain or visual disturbances, denies red eyes  Respiratory- denies cough or SOB  Cardio- denies chest pain or palpitations  GI- denies abdominal pain  Endocrine- denies urinary frequency  Urinary- denies pain with urination  Musculoskeletal- Negative except noted above  Skin- denies rashes or wounds  Neurological- denies dizziness or headache  Psychiatric- denies anxiety or difficulty concentrating    Physical Exam  There were no vitals taken for this visit.    General/Constitutional: No apparent distress: well-nourished and well developed.  Lymphatic: No appreciable lymphadenopathy  Respiratory: Non-labored breathing  Vascular: No edema, swelling or tenderness, except as noted in detailed exam.  Integumentary: No impressive skin lesions present, except as noted in detailed exam.  Psych: Normal mood and affect, oriented to person, place and time.  MSK: normal other than stated in HPI and exam  Gait & balance: no evidence of myelopathic gait, ambulates Independently     Lumbar spine range of motion:  -Forward flexion to 90  -Extension to neutral  There is mild tenderness with palpation along lumbar paraspinal musculature, no midline tenderness     Neurologic:    Lower Extremity Motor Function    Right  Left    Iliopsoas  5/5  5/5    Quadriceps 5/5 5/5   Tibialis anterior  5/5  5/5    EHL  5/5  5/5    Gastroc. muscle  5/5  5/5      Sensory: light touch is intact to bilateral lower extremities     Reflexes:  Intact    Other tests:  Straight Leg Raise: negative  Anitha SI: negative  LIA SI: negative  Greater troch: no tenderness   Internal/external hip ROM: intact, no pain   Flexion/extension knee ROM: intact, no pain   Vascular: WWP extremities    Electronic Medical Records were reviewed.    Procedures, if performed today     None performed       Portions of the record may have been  "created with voice recognition software.  Occasional wrong word or \"sound a like\" substitutions may have occurred due to the inherent limitations of voice recognition software.  Read the chart carefully and recognize, using context, where substitutions have occurred.      "

## 2025-04-09 ENCOUNTER — HOSPITAL ENCOUNTER (OUTPATIENT)
Dept: MRI IMAGING | Facility: HOSPITAL | Age: 80
Discharge: HOME | End: 2025-04-09
Payer: COMMERCIAL

## 2025-04-09 DIAGNOSIS — M47.816 LUMBAR SPONDYLOSIS: ICD-10-CM

## 2025-04-09 DIAGNOSIS — M54.16 RADICULOPATHY, LUMBAR REGION: ICD-10-CM

## 2025-04-09 PROCEDURE — 72148 MRI LUMBAR SPINE W/O DYE: CPT

## 2025-04-18 ENCOUNTER — EVALUATION (OUTPATIENT)
Dept: PHYSICAL THERAPY | Facility: CLINIC | Age: 80
End: 2025-04-18
Payer: COMMERCIAL

## 2025-04-18 DIAGNOSIS — M54.16 RADICULOPATHY, LUMBAR REGION: ICD-10-CM

## 2025-04-18 DIAGNOSIS — M47.816 LUMBAR SPONDYLOSIS: ICD-10-CM

## 2025-04-18 PROCEDURE — 97161 PT EVAL LOW COMPLEX 20 MIN: CPT | Performed by: PHYSICAL THERAPIST

## 2025-04-18 PROCEDURE — 97110 THERAPEUTIC EXERCISES: CPT | Performed by: PHYSICAL THERAPIST

## 2025-04-18 NOTE — HOME EXERCISE EDUCATION
Program_ID:[id number]   Access Code: [email or web address]  URL: [email or web address]  Date: 04-  Prepared By: Froylan Thomas    Program Notes      Exercises      - Supine Lower Trunk Rotation - 1 x daily -  x weekly -  sets - 5 reps - 10 hold      - Hooklying Single Knee to Chest Stretch - 1 x daily -  x weekly -  sets - 5 reps - 10 hold      - Supine Active Straight Leg Raise - 1 x daily -  x weekly - 2 sets - 10 reps      - Clamshell with Resistance - 1 x daily - 7 x weekly - 3 sets - 10 reps

## 2025-04-18 NOTE — PROGRESS NOTES
PT Evaluation     Today's date: 2025  Patient name: Kathryn Ramsey  : 1945  MRN: [id number]  Referring provider: Kraig Gunn DO  Dx:   Encounter Diagnosis     ICD-10-CM    1. Radiculopathy, lumbar region  M54.16 Ambulatory Referral to Physical Therapy      2. Lumbar spondylosis  M47.816 Ambulatory Referral to Physical Therapy                     Assessment  Impairments: abnormal or restricted ROM, abnormal movement, activity intolerance, impaired physical strength, pain with function, poor posture , poor body mechanics, participation limitations, activity limitations and endurance  Symptom irritability: moderate    Assessment details: Pt is a 79 y.o. year old female presenting to physical therapy for lumbar radiculopathy and lumbar spondylosis.  She presents with the following impairments: limited lumbar ROM, LE weakness, hypomobility and pain in lumbar spine, (+) LIA on the L affecting her function with bending, lifting, twisting, carrying, laundry, and vacuuming.  Pt will benefit from skilled physical therapy to address functional limitations noted in evaluation and meet patient goals.    Goals  ST.    Pt will reduce pain to 0/10  2.    Pt will demonstrate good standing and sitting posture.    LT.   Pt will improve lumbar ext to nil deficits for improved standing..  2.   Pt will improve hip flexion strength to 5/5 for improved squatting and lifting.  3.   Pt will meet projected FOTO score.      Plan  Patient would benefit from: PT eval and skilled physical therapy  Planned modality interventions: cryotherapy, biofeedback, electrical stimulation/Russian stimulation, TENS, thermotherapy: hydrocollator packs and unattended electrical stimulation    Planned therapy interventions: neuromuscular re-education, nerve gliding, joint mobilization, strengthening, stretching, therapeutic activities, therapeutic exercise, home exercise program, postural training, body mechanics training,  functional ROM exercises and flexibility    Frequency: 1-2x week  Duration in weeks: 6        Subjective Evaluation    History of Present Illness  Mechanism of injury: Pt reports onset of low back pain about a month and a half.  Her pain was s bad at first, she could barely walk, stand , or sit.  She received some medications for pain with no improvement.  She was recommended for pain management and PT, but she is reluctant to have injections at pain management.  Her pain is improving, but she has difficulty carrying wash baskets up the stairs, difficulty carrying and pushing the vacuum.  She is going on vacation soon and wants to be able to walk for long periods.  She also wants to be able to walk and stand all day [id number].  Pain  Current pain ratin          Objective     Postural Observations  Seated posture: poor  Standing posture: poor      Palpation   Left   Hypertonic in the erector spinae and lumbar paraspinals.   Tenderness of the erector spinae and lumbar paraspinals.     Right   Hypertonic in the erector spinae and lumbar paraspinals.   Tenderness of the erector spinae and lumbar paraspinals.     Active Range of Motion     Lumbar   Flexion:  WFL  Extension:  with pain Restriction level: minimal  Left lateral flexion:  WFL  Right lateral flexion:  WFL  Left rotation:  WFL  Right rotation:  WFL    Joint Play     Hypomobile: T8, T9, T10, T11, T12, L1, L2, L3, L4, L5 and S1     Pain: T10, T11, T12, L1, L2, L3, L4, L5 and S1     Strength/Myotome Testing     Lumbar   Left   Heel walk: normal  Toe walk: normal    Right   Heel walk: normal  Toe walk: normal    Left Hip   Planes of Motion   Flexion: 4-  Abduction: 4-  Adduction: 4    Right Hip   Planes of Motion   Flexion: 4  Abduction: 4-  Adduction: 4    Left Knee   Flexion: 4  Extension: 4+    Right Knee   Flexion: 4  Extension: 4+    Additional Strength Details  Poor squat mechanics    Muscle Activation   Patient able to activate left  "transverse abdominals and right transverse abdominals.     Additional Muscle Activation Details  Good TA activation    Tests     Lumbar     Left   Negative slump test.     Right   Negative slump test.     Left Hip   Positive LIA.     Right Hip   Negative LIA.     Ambulation     Observational Gait   Gait: asymmetric   Decreased walking speed and stride length.              Precautions:     Date 4/18            Visit # IE            FOTO IE             Re-eval IE              Manuals 4/18            Lumbar rot mob SF                                                   Neuro Re-Ed             TA brace             Supine marches             Pallof Press             SB TA press                                                    Ther Ex             Bike             LTR 5x10\"            SKTC 3x10\"            SLR 10x            Clams 15x GTB            Leg press                                       Ther Activity             STS                          Gait Training                                       Modalities                                            "

## 2025-04-23 ENCOUNTER — OFFICE VISIT (OUTPATIENT)
Dept: PHYSICAL THERAPY | Facility: CLINIC | Age: 80
End: 2025-04-23
Payer: COMMERCIAL

## 2025-04-23 DIAGNOSIS — M47.816 LUMBAR SPONDYLOSIS: ICD-10-CM

## 2025-04-23 DIAGNOSIS — M54.16 RADICULOPATHY, LUMBAR REGION: Primary | ICD-10-CM

## 2025-04-23 PROCEDURE — 97110 THERAPEUTIC EXERCISES: CPT | Performed by: PHYSICAL THERAPIST

## 2025-04-23 NOTE — PROGRESS NOTES
"Daily Note     Today's date: 2025  Patient name: Kathryn Ramsey  : 1945  MRN: [id number]  Referring provider: Kraig Gunn DO  Dx:   Encounter Diagnosis     ICD-10-CM    1. Radiculopathy, lumbar region  M54.16       2. Lumbar spondylosis  M47.816                      Subjective: Pt reports no major changes since her last visit.  She does report toe cramping with clamshells in her HEP.      Objective: See treatment diary below      Assessment:  Pt does well w progression of today's session.  She has no toe cramping completing clamshells today with her shoes on.  She is challenged w leg press and recumbent bike with some hamstring soreness afterwards. Patient demonstrated fatigue post treatment, exhibited good technique with therapeutic exercises, and would benefit from continued PT.      Plan: Continue per plan of care.  Progress treatment as tolerated.       Precautions:     Date            Visit # IE 2           FOTO IE             Re-eval IE              Manuals            Lumbar rot mob SF SF                                                  Neuro Re-Ed             TA brace             Supine marches             Pallof Press             SB TA press  10x10\" supine                                                  Ther Ex             Bike  5'           LTR 5x10\" 5x10\"           SKTC 3x10\" 5x10\"           SLR 10x 15x           Clams 15x GTB 15x GTB           Leg press  3x10 55#           Hamstring stretch  4x10\" standing                        Ther Activity             STS                          Gait Training                                       Modalities                                            "

## 2025-04-25 ENCOUNTER — APPOINTMENT (OUTPATIENT)
Dept: PHYSICAL THERAPY | Facility: CLINIC | Age: 80
End: 2025-04-25
Payer: COMMERCIAL

## 2025-04-30 ENCOUNTER — OFFICE VISIT (OUTPATIENT)
Dept: PHYSICAL THERAPY | Facility: CLINIC | Age: 80
End: 2025-04-30
Payer: COMMERCIAL

## 2025-04-30 DIAGNOSIS — M47.816 LUMBAR SPONDYLOSIS: ICD-10-CM

## 2025-04-30 DIAGNOSIS — M54.16 RADICULOPATHY, LUMBAR REGION: Primary | ICD-10-CM

## 2025-04-30 PROCEDURE — 97112 NEUROMUSCULAR REEDUCATION: CPT | Performed by: PHYSICAL THERAPIST

## 2025-04-30 PROCEDURE — 97110 THERAPEUTIC EXERCISES: CPT | Performed by: PHYSICAL THERAPIST

## 2025-04-30 NOTE — PROGRESS NOTES
"Daily Note     Today's date: 2025  Patient name: Kathryn Ramsey  : 1945  MRN: [id number]  Referring provider: Kraig Gunn DO  Dx:   Encounter Diagnosis     ICD-10-CM    1. Radiculopathy, lumbar region  M54.16       2. Lumbar spondylosis  M47.816                      Subjective: Pt reports feeling better today.  She regrets having to cancel last session, but needed to help her sister.      Objective: See treatment diary below      Assessment:  Pt does well w today's session and has no pain or change in sx. Patient demonstrated fatigue post treatment, exhibited good technique with therapeutic exercises, and would benefit from continued PT      Plan: Continue per plan of care.  Progress treatment as tolerated.       Precautions:     Date           Visit # IE 2 3          FOTO IE             Re-eval IE              Manuals           Lumbar rot mob SF SF                                                  Neuro Re-Ed             TA brace             Supine marches             Pallof Press             SB TA press  10x10\" supine 10x10\" supine          Pt edu   SF                                    Ther Ex             Bike  5' 6'          LTR 5x10\" 5x10\" 5x10\"          SKTC 3x10\" 5x10\" 5x10\"          SLR 10x 15x 15x ea          Clams 15x GTB 15x GTB 20x GTB          Leg press  3x10 55# 3x10 55#          Hamstring stretch  4x10\" standing 3x20\" standing                       Ther Activity             STS                          Gait Training                                       Modalities                                              "

## 2025-05-02 ENCOUNTER — OFFICE VISIT (OUTPATIENT)
Dept: PHYSICAL THERAPY | Facility: CLINIC | Age: 80
End: 2025-05-02
Payer: COMMERCIAL

## 2025-05-02 DIAGNOSIS — M47.816 LUMBAR SPONDYLOSIS: ICD-10-CM

## 2025-05-02 DIAGNOSIS — M54.16 RADICULOPATHY, LUMBAR REGION: Primary | ICD-10-CM

## 2025-05-02 PROCEDURE — 97530 THERAPEUTIC ACTIVITIES: CPT | Performed by: PHYSICAL THERAPIST

## 2025-05-02 PROCEDURE — 97110 THERAPEUTIC EXERCISES: CPT | Performed by: PHYSICAL THERAPIST

## 2025-05-02 PROCEDURE — 97112 NEUROMUSCULAR REEDUCATION: CPT | Performed by: PHYSICAL THERAPIST

## 2025-05-02 NOTE — PROGRESS NOTES
"Daily Note     Today's date: 2025  Patient name: Kathryn Ramsey  : 1945  MRN: [id number]  Referring provider: Kraig Gunn DO  Dx:   Encounter Diagnosis     ICD-10-CM    1. Radiculopathy, lumbar region  M54.16       2. Lumbar spondylosis  M47.816                      Subjective: Pt reports feeling great today.      Objective: See treatment diary below      Assessment:   Pt does well w progression of today's session and is challenged w addition of squats and hip ABD in standing.  She demonstrates improvement in core stabilization and does well t/o session.  She does well w clamshell progression and needs some cues for form with dead bug LE tapping. Patient is I w HEP and is being discharged from therapy today.      Plan: DC w HEP.     Precautions:     Date          Visit # IE 2 3 4         FOTO IE             Re-eval IE              Manuals          Lumbar rot mob SF SF                                                  Neuro Re-Ed             TA brace             Supine marches    2x10 dead bug LE taps         Pallof Press             SB TA press  10x10\" supine 10x10\" supine 10x10\" standing         Pt edu   SF                                    Ther Ex             Bike  5' 6' 6'         LTR 5x10\" 5x10\" 5x10\" 5x10\"         SKTC 3x10\" 5x10\" 5x10\"          SLR 10x 15x 15x ea 20x ea         Clams 15x GTB 15x GTB 20x GTB 20x BTB         Leg press  3x10 55# 3x10 55# 3x10 65#         Hamstring stretch  4x10\" standing 3x20\" standing 3x20\" standing                      Ther Activity             STS    15x         Standing hip abd    20x PTB                      Gait Training                                       Modalities                                                "

## 2025-07-08 ENCOUNTER — OFFICE VISIT (OUTPATIENT)
Dept: OBGYN CLINIC | Facility: HOSPITAL | Age: 80
End: 2025-07-08
Payer: COMMERCIAL

## 2025-07-08 VITALS — HEIGHT: 55 IN | WEIGHT: 149.91 LBS | BODY MASS INDEX: 34.69 KG/M2

## 2025-07-08 DIAGNOSIS — M51.360 DEGENERATION OF INTERVERTEBRAL DISC OF LUMBAR REGION WITH DISCOGENIC BACK PAIN: Primary | ICD-10-CM

## 2025-07-08 DIAGNOSIS — M47.816 LUMBAR SPONDYLOSIS: ICD-10-CM

## 2025-07-08 PROCEDURE — 99213 OFFICE O/P EST LOW 20 MIN: CPT | Performed by: ORTHOPAEDIC SURGERY

## 2025-07-08 NOTE — PROGRESS NOTES
Name: Kathryn Ramsey      : 1945       MRN: [id number]   Encounter Provider: Juan Manuel Moore MD   Encounter Date: 25  Encounter department: [facility]    Medical Decision Making:     Assessment & Plan  Degeneration of intervertebral disc of lumbar region with discogenic back pain  Below   Orders:    Ambulatory Referral to Chiropractic; Future         The clinical, physical and imaging findings were reviewed with the patient  We reviewed her lumbar MRI in detail which demonstrates mild disc protrusion at L3-4 without any significant neural compression  Discussed the etiology and pathomechanics of lumbar disc degeneration  Discussed  non operative treatment options   Non operative treatment options include physical therapy, at home exercises, activity modifications, chiropractic medicine, acupuncture, oral medications, and interventional spine procedures  After discussion with the patient we agreed upon continued conservative treatments  Referral to chiropractic care for evaluation and treatment. Discussed potential role of chiropractic care in targeting current pain and symptoms.  Recommend establishing care with pain management for evaluation and treatment. Discussed potential role of steroid injection at or near the source of pain to provide targeted relief.  Patient previously provided referral to [facility] rehab.  Ice/heat, OTC pain medication as needed.  Follow-up: Return as needed.      Diagnostic Tests   IMAGING: I have personally reviewed the images and these are my findings:     MRI from 2025: Mildly progressive degenerative discogenic disease, most notably at L3-4 where there is a new central disc extrusion with inferior migration resulting in mild spinal stenosis.   Multilevel facet arthropathy.          Lumbar Spine X-rays from 2025: multi level lumbar spondylosis with loss of disc height, facet  "hypertrophy, no appreciated lytic/blastic lesions, there is L1-2 and L4-5 retrolisthesis.         Subjective:      Chief Complaint: Back Pain    HPI:  Kathryn Ramsey is a 79 y.o. female presenting for initial visit with chief complaint of lower back pain located at the belt line. Patient was referred by Kraig Gunn DO. Pain began years ago, but has worsened substantially over the last month.  Describes pain as a stabbing pain in nature, 10/10 today.  Pain is worse with siting, standing, laying down and improves somewhat with Celebrex.  Denies any natasha trauma. Denies fever or chills, no night sweats. Denies any bladder or bowel changes.  She feels limited by her symptoms. Previously followed by Dr. Upton who previously gave her facet injections with good relief of her symptoms up to 1 year at a time. She appreciated that he let her bring her maggi bear with her into the OR for comfort when she received injections. She states he's since passed away unfortunately and she's looking for a new provider. She's tried injections with [facility] but felt that they \"over did it\" and she did not get adequate relief of her symptoms. She has tried physical therapy and activity modifications with some relief. Reports substantial difficulty with stairs and cleaning the house. She states she did physical therapy years ago.    Interval History 7/8/2025: Patient presents for follow-up evaluation of her low back pain and MRI review.  Patient states that since her last visit her back pain has not decreased.  She has been compliant with formal physical therapy and her home exercise program however this did help her movement but did not decrease the pain she is experiencing with activities of daily living such as lifting her laundry basket.  Patient states she will experience pain on a daily basis that will travel up her back.  She denies any distal paresthesias numbness or tingling into her legs or pain " traveling down her leg.  She states she was unable to make contact with [facility] for a possible injection as she had called multiple times without success in reaching the office.  Patient states the Celebrex she is taking is providing less pain control at 100 mg.      Conservative therapy includes the following:   Medications: Celebrex    Injections: Yes with good improvement of her symptoms.  Physical Therapy: Yes, recently with [facility]   Chiropractic Medicine: has not attempted  Accupunture/Massage Therapy: has not attempted     Nicotine dependent: Yes  Occupation: retired  Living situation: Lives with family   ADLs: patient is unable to perform ADL's such as shopping, cleaning, driving, etc     Objective:     Family History   Problem Relation Name Age of Onset    No Known Problems Mother      No Known Problems Father      No Known Problems Sister      No Known Problems Sister      Colon cancer Maternal Grandmother          age dx unknown    Colon cancer Maternal Grandfather      No Known Problems Paternal Grandmother      No Known Problems Paternal Grandfather      No Known Problems Brother      Breast cancer Maternal Aunt Corinne 85       Past Medical History:   Diagnosis Date    History of uterine cancer     Insomnia     Lumbar disc disease        Current Outpatient Medications   Medication Sig Dispense Refill    albuterol (Proventil HFA) 90 mcg/act inhaler Inhale 2 puffs every 6 (six) hours as needed for wheezing 6.7 g 1    ascorbic acid (VITAMIN C) 500 mg tablet Take 500 mg by mouth in the morning.      Calcium Citrate-Vitamin D (CALCIUM CITRATE + PO) Take 1 tablet by mouth in the morning.      celecoxib (CeleBREX) 100 mg capsule Take 1 capsule (100 mg total) by mouth 2 (two) times a day as needed for moderate pain 30 capsule 0    Cholecalciferol (VITAMIN D-3 PO) Take 5,000 Units by mouth in the morning.      clobetasol (TEMOVATE) 0.05 % external solution       co-enzyme Q-10 50 MG capsule Take 200 mg by  mouth in the morning.      cyanocobalamin (VITAMIN B-12) 500 mcg tablet Take 1,000 mcg by mouth in the morning.      Glucosamine-Chondroitin 500-400 MG CAPS Take by mouth      Iron, Ferrous Sulfate, 75 (15 Fe) MG/ML SOLN Take by mouth      Iron-Vitamin C 100-250 MG TABS Take 1 tablet by mouth      Multiple Vitamin (MULTIVITAMIN) tablet Take 1 tablet by mouth in the morning.      oxybutynin (DITROPAN-XL) 5 mg 24 hr tablet TAKE 1 TABLET (5 MG TOTAL) BY MOUTH DAILY. 90 tablet 1    Potassium 99 MG TABS Take by mouth      Turmeric 400 MG CAPS Take by mouth      vitamin E, tocopherol, 1,000 units capsule Take 2,000 Units by mouth in the morning.      zinc gluconate 50 mg tablet Take 50 mg by mouth in the morning.      EPINEPHrine (EPIPEN) 0.3 mg/0.3 mL SOAJ Inject 0.3 mL (0.3 mg total) into a muscle once for 1 dose 0.6 mL 0    fexofenadine-pseudoephedrine (Allegra-D Allergy & Congestion) 180-240 MG per 24 hr tablet Take by mouth (Patient not taking: Reported on 3/13/2025)       No current facility-administered medications for this visit.       Past Surgical History:   Procedure Laterality Date    APPENDECTOMY      APPENDECTOMY      BREAST CYST EXCISION Right 10/24/2007    benign    BREAST CYST EXCISION Right 03/09/2016    benign    BREAST LUMPECTOMY      BREAST LUMPECTOMY Right 3/9/2016    Procedure:  RIGHT BREAST BIOPSY ,LUMPECTOMY BREAST NEEDLE LOCALIZED @ 1230 ;  Surgeon: Gem Masters MD;  Location: [facility];  Service:     BREAST SURGERY      lumpectomy    CATARACT EXTRACTION      COLONOSCOPY      HYSTERECTOMY      Age 23    MAMMO NEEDLE LOCALIZATION RIGHT (ALL INC) Right 3/9/2016    OOPHORECTOMY      RI HEMORRHOIDECTOMY INT & XTRNL 2/> COLUMN/KAELYN N/A 7/14/2021    Procedure: INTERNAL/EXTERNAL HEMORRHOIDECTOMY EXCISION;  Surgeon: Riley Washington DO;  Location:  MAIN OR;  Service: General    TONSILLECTOMY         Social History     Socioeconomic History    Marital status: /Civil Union     Spouse name: Not on  file    Number of children: Not on file    Years of education: Not on file    Highest education level: Not on file   Occupational History    Not on file   Tobacco Use    Smoking status: Every Day     Current packs/day: 0.50     Average packs/day: 0.5 packs/day for 60.5 years (30.3 ttl pk-yrs)     Types: Cigarettes     Start date: 12/29/1964     Passive exposure: Never    Smokeless tobacco: Never    Tobacco comments:     smoke one pack every two weeks   Vaping Use    Vaping status: Never Used   Substance and Sexual Activity    Alcohol use: Yes     Comment: Special occ    Drug use: Yes    Sexual activity: Not Currently   Other Topics Concern    Not on file   Social History Narrative    Not on file     Social Drivers of Health     Financial Resource Strain: Low Risk  (9/14/2023)    Overall Financial Resource Strain (CARDIA)     Difficulty of Paying Living Expenses: Not hard at all   Food Insecurity: No Food Insecurity (3/13/2025)    Nursing - Inadequate Food Risk Classification     Worried About Running Out of Food in the Last Year: Never true     Ran Out of Food in the Last Year: Never true     Ran Out of Food in the Last Year: Not on file   Transportation Needs: No Transportation Needs (3/13/2025)    PRAPARE - Transportation     Lack of Transportation (Medical): No     Lack of Transportation (Non-Medical): No   Physical Activity: Not on file   Stress: Not on file   Social Connections: Not on file   Intimate Partner Violence: Not on file   Housing Stability: Unknown (3/13/2025)    Housing Stability Vital Sign     Unable to Pay for Housing in the Last Year: No     Number of Times Moved in the Last Year: Not on file     Homeless in the Last Year: No       Allergies   Allergen Reactions    Codeine Hives    Morphine And Codeine Other (See Comments)     Hallucinations and vomiting. Pt reports being sensative to most    Other Anaphylaxis, GI Intolerance, Rash and Shortness Of Breath     All Seafood, Banana, mushrooms,  tomatoes, watermelon    With touching tomatoes    Penicillins Anaphylaxis    Ciprofloxacin Hives     Rash, vomiting    Banana - Food Allergy Hives    Mushroom Abdominal Pain    Mushroom Extract Complex - Food Allergy Hives    Tomato - Food Allergy Hives       Review of Systems  General- denies fever/chills  HEENT- denies hearing loss or sore throat  Eyes- denies eye pain or visual disturbances, denies red eyes  Respiratory- denies cough or SOB  Cardio- denies chest pain or palpitations  GI- denies abdominal pain  Endocrine- denies urinary frequency  Urinary- denies pain with urination  Musculoskeletal- Negative except noted above  Skin- denies rashes or wounds  Neurological- denies dizziness or headache  Psychiatric- denies anxiety or difficulty concentrating    Physical Exam  Ht 4' (1.219 m)   Wt 68 kg (149 lb 14.6 oz)   BMI 45.75 kg/m²     General/Constitutional: No apparent distress: well-nourished and well developed.  Lymphatic: No appreciable lymphadenopathy  Respiratory: Non-labored breathing  Vascular: No edema, swelling or tenderness, except as noted in detailed exam.  Integumentary: No impressive skin lesions present, except as noted in detailed exam.  Psych: Normal mood and affect, oriented to person, place and time.  MSK: normal other than stated in HPI and exam  Gait & balance: no evidence of myelopathic gait, ambulates Independently     Lumbar spine range of motion:  -Forward flexion to 90  -Extension to neutral  There is mild tenderness with palpation along lumbar paraspinal musculature, no midline tenderness     Neurologic:    Lower Extremity Motor Function    Right  Left    Iliopsoas  5/5  5/5    Quadriceps 5/5 5/5   Tibialis anterior  5/5  5/5    EHL  5/5  5/5    Gastroc. muscle  5/5  5/5      Sensory: light touch is intact to bilateral lower extremities     Reflexes:  Intact    Other tests:  Straight Leg Raise: negative  Anitha SI: negative  LIA SI: negative  Greater troch: no tenderness  "  Internal/external hip ROM: intact, no pain   Flexion/extension knee ROM: intact, no pain   Vascular: WWP extremities    Electronic Medical Records were reviewed.    Procedures, if performed today     None performed       Portions of the record may have been created with voice recognition software.  Occasional wrong word or \"sound a like\" substitutions may have occurred due to the inherent limitations of voice recognition software.  Read the chart carefully and recognize, using context, where substitutions have occurred.    "

## 2025-07-17 ENCOUNTER — CONSULT (OUTPATIENT)
Age: 80
End: 2025-07-17
Payer: COMMERCIAL

## 2025-07-17 VITALS — HEIGHT: 55 IN | BODY MASS INDEX: 34.48 KG/M2 | WEIGHT: 149 LBS

## 2025-07-17 DIAGNOSIS — M51.360 DEGENERATION OF INTERVERTEBRAL DISC OF LUMBAR REGION WITH DISCOGENIC BACK PAIN: ICD-10-CM

## 2025-07-17 DIAGNOSIS — M47.816 LUMBAR SPONDYLOSIS: ICD-10-CM

## 2025-07-17 DIAGNOSIS — M99.05 SEGMENTAL DYSFUNCTION OF PELVIC REGION: ICD-10-CM

## 2025-07-17 DIAGNOSIS — M99.04 SEGMENTAL DYSFUNCTION OF SACRAL REGION: ICD-10-CM

## 2025-07-17 DIAGNOSIS — M99.03 SEGMENTAL DYSFUNCTION OF LUMBAR REGION: ICD-10-CM

## 2025-07-17 DIAGNOSIS — M47.899 FACET JOINT SYNDROME: ICD-10-CM

## 2025-07-17 DIAGNOSIS — M51.26 LUMBAR DISC HERNIATION: Primary | ICD-10-CM

## 2025-07-17 PROCEDURE — 98941 CHIROPRACT MANJ 3-4 REGIONS: CPT | Performed by: CHIROPRACTOR

## 2025-07-17 PROCEDURE — 99203 OFFICE O/P NEW LOW 30 MIN: CPT | Performed by: CHIROPRACTOR

## 2025-07-17 NOTE — PROGRESS NOTES
Initial chiropractic visit--7/18/25, visit #1    Acute corrective treatment   Assessment/Plan:     1. Lumbar disc herniation        2. Degeneration of intervertebral disc of lumbar region with discogenic back pain  Ambulatory Referral to Chiropractic      3. Lumbar spondylosis  Ambulatory Referral to Chiropractic      4. Facet joint syndrome        5. Segmental dysfunction of lumbar region        6. Segmental dysfunction of sacral region        7. Segmental dysfunction of pelvic region            Review of Diagnostic Studies and Office Notes:    The patient's lumbar spine x-rays, lumbar spine MRI, orthopedic surgery notes were reviewed prior to the chiropractic evaluation today.      Results for orders placed during the hospital encounter of 04/09/25    MRI lumbar spine wo contrast    Narrative  MRI LUMBAR SPINE WITHOUT CONTRAST    INDICATION: M54.16: Radiculopathy, lumbar region  M47.816: Spondylosis without myelopathy or radiculopathy, lumbar region.    Additional history from orthopedic surgery note report 4/2/2025: Left lower left back pain located at the belt line, pain began years ago worsened substantially over the last month prior to presentation.    COMPARISON: MRI lumbar spine 9/8/2009, lumbar spine radiograph 4/2/2025    TECHNIQUE:  Multiplanar, multisequence imaging of the lumbar spine was performed. .  Imaging performed on 1.5T MRI    IMAGE QUALITY:  Diagnostic    FINDINGS:    VERTEBRAL BODIES:  There are 5 lumbar type vertebral bodies. Mild levocurvature with the apex at the thoracolumbar junction, possibly positional. Grade 1 retrolisthesis of L1 on L2. No spondylolysis. No compression fracture.    Normal marrow signal is  identified within the visualized bony structures.  No discrete marrow lesion.    SACRUM:  Normal signal within the sacrum. No evidence of insufficiency or stress fracture.    DISTAL CORD AND CONUS:  Normal size and signal within the distal cord and conus. Conus terminates at  L1.    PARASPINAL SOFT TISSUES:  Paraspinal soft tissues are unremarkable.    LOWER THORACIC DISC SPACES: Mild multilevel noncompressive degenerative discogenic disease in the lower thoracic spine.    LUMBAR DISC SPACES: No significant disc height loss.    L1-2: Mildly progressive degenerative discogenic disease with mild disc height loss and circumferential disc bulging with a central and paracentral annular fissure but no discrete herniation. No significant spinal canal or foraminal stenosis.    L2-3: Mild stable circumferential disc bulge with annular fissuring but no significant spinal canal or foraminal stenosis.    L3-4: Mild circumferential disc bulge with central annular fissure, not significantly changed when compared to the prior study. New central disc extrusion with inferior migration. Mild spinal stenosis no significant foraminal stenosis.    L4-5: Diffuse disc bulge, unchanged. Left facet arthropathy and buckling of the ligamentum flavum. No significant spinal canal or foraminal stenosis.    L5-S1: No spinal canal or foraminal stenosis. Right facet arthropathy.    Impression  Mildly progressive degenerative discogenic disease, most notably at L3-4 where there is a new central disc extrusion with inferior migration resulting in mild spinal stenosis.    Multilevel facet arthropathy.      Resident: Brett Garza I, the attending radiologist, have reviewed the images and agree with the final report above.    Workstation performed: [id number]      Results for orders placed during the hospital encounter of 04/02/25    XR spine lumbar minimum 4 views non injury    Narrative  XR SPINE LUMBAR MINIMUM 4 VIEWS NON INJURY    INDICATION: R52: Pain, unspecified.    COMPARISON: Lumbar spine x-ray dated January 7, 2025.    FINDINGS:    No acute fracture. Intact pedicles.    Five non-rib-bearing lumbar vertebral bodies.    No evidence of instability during flexion/extension.    Age-appropriate lumbar  degenerative changes. There is mild bilateral facet arthropathy at L4/L5 and L5/S1. There is mild intervertebral disc space narrowing at L4/L5 and L5/S1.    Unremarkable soft tissues.    Impression  No acute osseous abnormality.    Degenerative changes as described.      Computerized Assisted Algorithm (CAA) may have been used to analyze all applicable images.      Workstation performed: [id number]        Decision and Treatment Plan:    I reviewed my findings with the patient today.  Patient was interested in receiving a trial course of chiropractic care and was treated today.  We will treat the patient 2x/week for the next three weeks and re-evaluate. If there is improvement in symptoms and objective findings, frequency will be reduced.  Due to findings in her imaging and exam findings include treatment will be mainly myofascial with gentle low force mobilization/distraction.  teach her    The patient will be treated with conservative chiropractic care including myofascial release, joint mobilization, and a home stretching and icing program.      Patient Instructions:    Return for treatment twice next week.     TIME/Reviewed with Patient:    At least 33 minutes of time was spent with the patient during the consultation including the patient's history/current complaints, physical exam, reviewing the diagnostic report, reviewing home instruction/reducing risk factors with the patient, reviewing my findings with the patient, and discussing the treatment with the patient.     This is a separate identifiable portion of today's visit from the E and M code billed.    Treatment: Myofascial release was performed to the lumbar paraspinals, quadratus lumborum, gluteus medius bilaterally.    Manipulation was performed to the right SI joint, L5-S1, utilizing activator technique.  No HVLA was performed..    Subjective:      Kathryn Ramsey is a 79 y.o. female was referred over by Dr. Moore  She has chief complaint of lower  back pain.  The pain is across the lower lumbar region most intensely.  She rates her pain as a 10/10.  She does get sharp stabbing pain with certain movements.  She has had pain for many years but over the last few months the intensity of her pain has gotten worse.  She states that in the past she was treated by Dr. Upton who previously gave her facet joint injections with good relief.  She states that she was getting them regularly but Dr. Upton  has passed away.  She states she has tried physical therapy in the past. Just recently she did try a few visits of physical therapy with no relief.  She states she has not gone to the pain management doctor as she is concerned that the pain management doctors do their injections without x-ray guidance.  She thought that Alexsandra was the only physician doing facet joint injections doing injections  in the OR or injection suite with x-ray guidance.  She was under the impression that her pain management doctors did not do that.   She recently did have x-rays 4/2/25 lumbar spine.  She also had an MRI done on 4/9/2025.    She denies fever, chills, night sweats, recent unexplained weight loss, changes in bowel/bladder habits, urinary incontinence or retention.       Objective:    Appearance: Well developed, well nourished, and in no acute distress    Alert and oriented x 3    Mood/Affect: calm and pleasant    Gait/Posture:    Gait: Normal    Antalgic posture: None    Lordosis: Lumbar- normal    Kyphosis: Thoracic- normal    Lower extremity reflexes:    Deep tendon reflexes were normal and symmetrical in the bilateral lower extremities.    Lower Extremity Muscle Testing:    Muscle testing of the bilateral lower extremities was normal and graded +5/5.    Sensory:    Sensation to light touch was normal and symmetrical in the bilateral lower extremities.    Babinski was negative bilaterally.    Lumbar Orthopedic Tests:    Seated Straight Leg Raise was negative  on the  Right.    Seated Straight Leg Raise was negative  on the Left.    She was examined in a seated position as well as standing and side-lying positions.  Joint lumbar range of motion examination shows flexion to be 60% restricted with pain.  Extension is 50% restricted with pain.  Left lateral bending is 30% restricted with pain.  Right lateral bending is 30% restricted with pain.  Started the week      There is morning  Active Lumbar Ranges of Motion:    Lumbar range of motion examination shows flexion to be 60% restricted with pain.  Extension is 50% restricted with pain.  Left lateral bending is 30% restricted with pain.  Right lateral bending is 30% restricted with pain.      Palpation:    Moderate spasm and tenderness is noted in the lumbar paraspinals bilaterally, quadratus lumborum bilaterally, gluteus medius bilaterally.       Dictation voice to text software has been used in the creation of this document. Please consider this in light of any contextual or grammatical errors.

## 2025-07-23 ENCOUNTER — PROCEDURE VISIT (OUTPATIENT)
Age: 80
End: 2025-07-23
Payer: COMMERCIAL

## 2025-07-23 VITALS — BODY MASS INDEX: 34.48 KG/M2 | HEIGHT: 55 IN | WEIGHT: 149 LBS

## 2025-07-23 DIAGNOSIS — M51.360 DEGENERATION OF INTERVERTEBRAL DISC OF LUMBAR REGION WITH DISCOGENIC BACK PAIN: ICD-10-CM

## 2025-07-23 DIAGNOSIS — M99.04 SEGMENTAL DYSFUNCTION OF SACRAL REGION: ICD-10-CM

## 2025-07-23 DIAGNOSIS — M47.899 FACET JOINT SYNDROME: ICD-10-CM

## 2025-07-23 DIAGNOSIS — M99.03 SEGMENTAL DYSFUNCTION OF LUMBAR REGION: Primary | ICD-10-CM

## 2025-07-23 DIAGNOSIS — M47.816 LUMBAR SPONDYLOSIS: ICD-10-CM

## 2025-07-23 DIAGNOSIS — M51.26 LUMBAR DISC HERNIATION: ICD-10-CM

## 2025-07-23 DIAGNOSIS — M99.05 SEGMENTAL DYSFUNCTION OF PELVIC REGION: ICD-10-CM

## 2025-07-23 PROCEDURE — 98941 CHIROPRACT MANJ 3-4 REGIONS: CPT | Performed by: CHIROPRACTOR

## 2025-07-23 NOTE — PROGRESS NOTES
Assessment:     1. Segmental dysfunction of lumbar region        2. Degeneration of intervertebral disc of lumbar region with discogenic back pain        3. Segmental dysfunction of sacral region        4. Lumbar spondylosis        5. Segmental dysfunction of pelvic region        6. Lumbar disc herniation        7. Facet joint syndrome            Condition is the same    PLAN/TREATMENT:    Return for treatment later this week.     Continue using ice 15 minutes as needed for posttreatment soreness.    Treatment:  Myofascial release was performed to lumbar paraspinals, quadratus lumborum and gluteus medius bilaterally.    Manipulation was performed to right SI joint, L3-4 and T9-10 utilizing gentle side-lying flexion distraction and activator technique.  No HVLA was performed.    Subjective:    Kathryn is here today with ongoing complaints of lower back pain. Three days ago started feeling better.  Paiunis less intense.  Can bend without difficulty now.  Pain is 3/10 today.     Patient is feeling better since last visit.    Objective:  Mild/moderate spasm and tenderness is noted in the lumbar paraspinals bilaterally, quadratus lumborum bilaterally, gluteus medius bilaterally.     Decreased mobility and tenderness is noted at the right sacroiliac joint, L3-4, T9-10.  .

## 2025-07-25 ENCOUNTER — PROCEDURE VISIT (OUTPATIENT)
Age: 80
End: 2025-07-25
Payer: COMMERCIAL

## 2025-07-25 VITALS — HEIGHT: 55 IN | WEIGHT: 149 LBS | BODY MASS INDEX: 34.48 KG/M2

## 2025-07-25 DIAGNOSIS — M47.899 FACET JOINT SYNDROME: ICD-10-CM

## 2025-07-25 DIAGNOSIS — M99.05 SEGMENTAL DYSFUNCTION OF PELVIC REGION: ICD-10-CM

## 2025-07-25 DIAGNOSIS — M47.816 LUMBAR SPONDYLOSIS: ICD-10-CM

## 2025-07-25 DIAGNOSIS — M51.26 LUMBAR DISC HERNIATION: ICD-10-CM

## 2025-07-25 DIAGNOSIS — M51.360 DEGENERATION OF INTERVERTEBRAL DISC OF LUMBAR REGION WITH DISCOGENIC BACK PAIN: ICD-10-CM

## 2025-07-25 DIAGNOSIS — M99.03 SEGMENTAL DYSFUNCTION OF LUMBAR REGION: Primary | ICD-10-CM

## 2025-07-25 DIAGNOSIS — M99.04 SEGMENTAL DYSFUNCTION OF SACRAL REGION: ICD-10-CM

## 2025-07-25 PROCEDURE — 98941 CHIROPRACT MANJ 3-4 REGIONS: CPT | Performed by: CHIROPRACTOR

## 2025-07-28 ENCOUNTER — TELEPHONE (OUTPATIENT)
Age: 80
End: 2025-07-28

## 2025-07-28 DIAGNOSIS — M54.50 ACUTE BILATERAL LOW BACK PAIN WITHOUT SCIATICA: ICD-10-CM

## 2025-07-28 RX ORDER — CELECOXIB 200 MG/1
200 CAPSULE ORAL 2 TIMES DAILY PRN
Qty: 60 CAPSULE | Refills: 0 | Status: SHIPPED | OUTPATIENT
Start: 2025-07-28

## 2025-07-29 ENCOUNTER — PROCEDURE VISIT (OUTPATIENT)
Age: 80
End: 2025-07-29
Payer: COMMERCIAL

## 2025-07-29 VITALS — HEIGHT: 55 IN | BODY MASS INDEX: 34.48 KG/M2 | WEIGHT: 149 LBS

## 2025-07-29 DIAGNOSIS — M99.03 SEGMENTAL DYSFUNCTION OF LUMBAR REGION: Primary | ICD-10-CM

## 2025-07-29 DIAGNOSIS — M51.360 DEGENERATION OF INTERVERTEBRAL DISC OF LUMBAR REGION WITH DISCOGENIC BACK PAIN: ICD-10-CM

## 2025-07-29 DIAGNOSIS — M47.816 LUMBAR SPONDYLOSIS: ICD-10-CM

## 2025-07-29 DIAGNOSIS — M99.05 SEGMENTAL DYSFUNCTION OF PELVIC REGION: ICD-10-CM

## 2025-07-29 DIAGNOSIS — M47.899 FACET JOINT SYNDROME: ICD-10-CM

## 2025-07-29 DIAGNOSIS — M99.04 SEGMENTAL DYSFUNCTION OF SACRAL REGION: ICD-10-CM

## 2025-07-29 DIAGNOSIS — M51.26 LUMBAR DISC HERNIATION: ICD-10-CM

## 2025-07-29 PROCEDURE — 98941 CHIROPRACT MANJ 3-4 REGIONS: CPT | Performed by: CHIROPRACTOR

## 2025-08-05 ENCOUNTER — PROCEDURE VISIT (OUTPATIENT)
Age: 80
End: 2025-08-05
Payer: COMMERCIAL

## 2025-08-05 VITALS — HEIGHT: 55 IN | BODY MASS INDEX: 34.49 KG/M2 | WEIGHT: 149.03 LBS

## 2025-08-05 DIAGNOSIS — M99.04 SEGMENTAL DYSFUNCTION OF SACRAL REGION: ICD-10-CM

## 2025-08-05 DIAGNOSIS — M47.816 LUMBAR SPONDYLOSIS: ICD-10-CM

## 2025-08-05 DIAGNOSIS — M51.26 LUMBAR DISC HERNIATION: ICD-10-CM

## 2025-08-05 DIAGNOSIS — M99.05 SEGMENTAL DYSFUNCTION OF PELVIC REGION: ICD-10-CM

## 2025-08-05 DIAGNOSIS — M47.899 FACET JOINT SYNDROME: ICD-10-CM

## 2025-08-05 DIAGNOSIS — M99.03 SEGMENTAL DYSFUNCTION OF LUMBAR REGION: Primary | ICD-10-CM

## 2025-08-05 DIAGNOSIS — M51.360 DEGENERATION OF INTERVERTEBRAL DISC OF LUMBAR REGION WITH DISCOGENIC BACK PAIN: ICD-10-CM

## 2025-08-05 PROCEDURE — 98941 CHIROPRACT MANJ 3-4 REGIONS: CPT | Performed by: CHIROPRACTOR

## 2025-08-07 ENCOUNTER — PROCEDURE VISIT (OUTPATIENT)
Age: 80
End: 2025-08-07
Payer: COMMERCIAL

## 2025-08-07 VITALS — HEIGHT: 55 IN | BODY MASS INDEX: 34.48 KG/M2 | WEIGHT: 149 LBS

## 2025-08-07 DIAGNOSIS — M47.816 LUMBAR SPONDYLOSIS: ICD-10-CM

## 2025-08-07 DIAGNOSIS — M51.360 DEGENERATION OF INTERVERTEBRAL DISC OF LUMBAR REGION WITH DISCOGENIC BACK PAIN: ICD-10-CM

## 2025-08-07 DIAGNOSIS — M99.05 SEGMENTAL DYSFUNCTION OF PELVIC REGION: ICD-10-CM

## 2025-08-07 DIAGNOSIS — M47.899 FACET JOINT SYNDROME: ICD-10-CM

## 2025-08-07 DIAGNOSIS — M51.26 LUMBAR DISC HERNIATION: ICD-10-CM

## 2025-08-07 DIAGNOSIS — M99.04 SEGMENTAL DYSFUNCTION OF SACRAL REGION: ICD-10-CM

## 2025-08-07 DIAGNOSIS — M99.03 SEGMENTAL DYSFUNCTION OF LUMBAR REGION: Primary | ICD-10-CM

## 2025-08-07 PROCEDURE — 98941 CHIROPRACT MANJ 3-4 REGIONS: CPT | Performed by: CHIROPRACTOR

## 2025-08-14 ENCOUNTER — PROCEDURE VISIT (OUTPATIENT)
Age: 80
End: 2025-08-14

## (undated) DEVICE — BASIC PACK: Brand: CONVERTORS

## (undated) DEVICE — WET SKIN PREP TRAY: Brand: MEDLINE INDUSTRIES, INC.

## (undated) DEVICE — PENCIL ELECTROSURG E-Z CLEAN -0035H

## (undated) DEVICE — LUBRICANT SURGILUBE TUBE 4 OZ  FLIP TOP

## (undated) DEVICE — SYRINGE 30ML LL

## (undated) DEVICE — SKIN MARKER DUAL TIP WITH RULER CAP, FLEXIBLE RULER AND LABELS: Brand: DEVON

## (undated) DEVICE — LIGHT GLOVE GREEN

## (undated) DEVICE — SPONGE STICK WITH PVP-I: Brand: KENDALL

## (undated) DEVICE — SINGLE PORT MANIFOLD: Brand: NEPTUNE 2

## (undated) DEVICE — GAUZE SPONGES,16 PLY: Brand: CURITY

## (undated) DEVICE — SYRINGE 10ML LL CONTROL TOP

## (undated) DEVICE — INTENDED FOR TISSUE SEPARATION, AND OTHER PROCEDURES THAT REQUIRE A SHARP SURGICAL BLADE TO PUNCTURE OR CUT.: Brand: BARD-PARKER SAFETY BLADES SIZE 15, STERILE

## (undated) DEVICE — SPONGE 4 X 4 XRAY 16 PLY STRL LF RFD

## (undated) DEVICE — TELFA NON-ADHERENT ABSORBENT DRESSING: Brand: TELFA

## (undated) DEVICE — SURGICAL CLIPPER BLADE GENERAL USE

## (undated) DEVICE — 1820 FOAM BLOCK NEEDLE COUNTER: Brand: DEVON

## (undated) DEVICE — NEEDLE 25G X 1 1/2

## (undated) DEVICE — GLOVE SRG LF STRL BGL SKNSNS 7 PF

## (undated) DEVICE — MINOR PROCEDURE DRAPE: Brand: CONVERTORS

## (undated) DEVICE — SUT VICRYL 3-0 SH 27 IN J416H

## (undated) DEVICE — POOLE SUCTION INSTRUMENT WITH REMOVABLE SHEATH AND PREATTACHED 6 FT. (1.8M) CLEAR PLASTIC TUBING: Brand: POOLE

## (undated) DEVICE — NEEDLE BLUNT 18 G X 1 1/2IN